# Patient Record
Sex: FEMALE | Race: WHITE | NOT HISPANIC OR LATINO | URBAN - METROPOLITAN AREA
[De-identification: names, ages, dates, MRNs, and addresses within clinical notes are randomized per-mention and may not be internally consistent; named-entity substitution may affect disease eponyms.]

---

## 2017-06-26 ENCOUNTER — INPATIENT (INPATIENT)
Facility: HOSPITAL | Age: 82
LOS: 9 days | Discharge: HOME | DRG: 280 | End: 2017-07-06
Attending: FAMILY MEDICINE | Admitting: HOSPITALIST
Payer: MEDICARE

## 2017-06-26 VITALS
DIASTOLIC BLOOD PRESSURE: 91 MMHG | SYSTOLIC BLOOD PRESSURE: 165 MMHG | RESPIRATION RATE: 20 BRPM | TEMPERATURE: 99 F | HEART RATE: 118 BPM | OXYGEN SATURATION: 98 %

## 2017-06-26 DIAGNOSIS — C18.9 MALIGNANT NEOPLASM OF COLON, UNSPECIFIED: ICD-10-CM

## 2017-06-26 DIAGNOSIS — Z98.89 OTHER SPECIFIED POSTPROCEDURAL STATES: Chronic | ICD-10-CM

## 2017-06-26 DIAGNOSIS — Z96.641 PRESENCE OF RIGHT ARTIFICIAL HIP JOINT: Chronic | ICD-10-CM

## 2017-06-26 DIAGNOSIS — K21.9 GASTRO-ESOPHAGEAL REFLUX DISEASE WITHOUT ESOPHAGITIS: ICD-10-CM

## 2017-06-26 DIAGNOSIS — I48.91 UNSPECIFIED ATRIAL FIBRILLATION: ICD-10-CM

## 2017-06-26 DIAGNOSIS — E13.8 OTHER SPECIFIED DIABETES MELLITUS WITH UNSPECIFIED COMPLICATIONS: ICD-10-CM

## 2017-06-26 DIAGNOSIS — R11.2 NAUSEA WITH VOMITING, UNSPECIFIED: ICD-10-CM

## 2017-06-26 DIAGNOSIS — I15.9 SECONDARY HYPERTENSION, UNSPECIFIED: ICD-10-CM

## 2017-06-26 LAB
ALBUMIN SERPL ELPH-MCNC: 3.3 G/DL — SIGNIFICANT CHANGE UP (ref 3.3–5.2)
ALP SERPL-CCNC: 74 U/L — SIGNIFICANT CHANGE UP (ref 40–120)
ALT FLD-CCNC: 15 U/L — SIGNIFICANT CHANGE UP
ANION GAP SERPL CALC-SCNC: 20 MMOL/L — HIGH (ref 5–17)
APTT BLD: 49.3 SEC — HIGH (ref 27.5–37.4)
AST SERPL-CCNC: 29 U/L — SIGNIFICANT CHANGE UP
BILIRUB SERPL-MCNC: 0.7 MG/DL — SIGNIFICANT CHANGE UP (ref 0.4–2)
BLD GP AB SCN SERPL QL: SIGNIFICANT CHANGE UP
BUN SERPL-MCNC: 31 MG/DL — HIGH (ref 8–20)
CALCIUM SERPL-MCNC: 8.1 MG/DL — LOW (ref 8.6–10.2)
CHLORIDE SERPL-SCNC: 90 MMOL/L — LOW (ref 98–107)
CK SERPL-CCNC: 102 U/L — SIGNIFICANT CHANGE UP (ref 25–170)
CO2 SERPL-SCNC: 23 MMOL/L — SIGNIFICANT CHANGE UP (ref 22–29)
CREAT SERPL-MCNC: 1.64 MG/DL — HIGH (ref 0.5–1.3)
GLUCOSE SERPL-MCNC: 192 MG/DL — HIGH (ref 70–115)
HCT VFR BLD CALC: 33.9 % — LOW (ref 37–47)
HGB BLD-MCNC: 10.4 G/DL — LOW (ref 12–16)
INR BLD: 12.64 RATIO — CRITICAL HIGH (ref 0.88–1.16)
MCHC RBC-ENTMCNC: 23.2 PG — LOW (ref 27–31)
MCHC RBC-ENTMCNC: 30.7 G/DL — LOW (ref 32–36)
MCV RBC AUTO: 75.5 FL — LOW (ref 81–99)
PLATELET # BLD AUTO: 444 K/UL — HIGH (ref 150–400)
POTASSIUM SERPL-MCNC: 4.9 MMOL/L — SIGNIFICANT CHANGE UP (ref 3.5–5.3)
POTASSIUM SERPL-SCNC: 4.9 MMOL/L — SIGNIFICANT CHANGE UP (ref 3.5–5.3)
PROT SERPL-MCNC: 6.8 G/DL — SIGNIFICANT CHANGE UP (ref 6.6–8.7)
PROTHROM AB SERPL-ACNC: 146.4 SEC — HIGH (ref 9.8–12.7)
RBC # BLD: 4.49 M/UL — SIGNIFICANT CHANGE UP (ref 4.4–5.2)
RBC # FLD: 16.7 % — HIGH (ref 11–15.6)
SODIUM SERPL-SCNC: 133 MMOL/L — LOW (ref 135–145)
TROPONIN T SERPL-MCNC: 0.1 NG/ML — HIGH (ref 0–0.06)
TYPE + AB SCN PNL BLD: SIGNIFICANT CHANGE UP
WBC # BLD: 7.5 K/UL — SIGNIFICANT CHANGE UP (ref 4.8–10.8)
WBC # FLD AUTO: 7.5 K/UL — SIGNIFICANT CHANGE UP (ref 4.8–10.8)

## 2017-06-26 PROCEDURE — 70450 CT HEAD/BRAIN W/O DYE: CPT | Mod: 26

## 2017-06-26 PROCEDURE — 74176 CT ABD & PELVIS W/O CONTRAST: CPT | Mod: 26

## 2017-06-26 PROCEDURE — 93458 L HRT ARTERY/VENTRICLE ANGIO: CPT | Mod: 26

## 2017-06-26 PROCEDURE — 99223 1ST HOSP IP/OBS HIGH 75: CPT

## 2017-06-26 PROCEDURE — 99285 EMERGENCY DEPT VISIT HI MDM: CPT

## 2017-06-26 PROCEDURE — 71010: CPT | Mod: 26

## 2017-06-26 RX ORDER — ONDANSETRON 8 MG/1
4 TABLET, FILM COATED ORAL EVERY 6 HOURS
Qty: 0 | Refills: 0 | Status: DISCONTINUED | OUTPATIENT
Start: 2017-06-26 | End: 2017-06-27

## 2017-06-26 RX ORDER — FAMOTIDINE 10 MG/ML
20 INJECTION INTRAVENOUS ONCE
Qty: 0 | Refills: 0 | Status: COMPLETED | OUTPATIENT
Start: 2017-06-26 | End: 2017-06-26

## 2017-06-26 RX ORDER — ONDANSETRON 8 MG/1
8 TABLET, FILM COATED ORAL ONCE
Qty: 0 | Refills: 0 | Status: COMPLETED | OUTPATIENT
Start: 2017-06-26 | End: 2017-06-26

## 2017-06-26 RX ORDER — SODIUM CHLORIDE 9 MG/ML
1000 INJECTION INTRAMUSCULAR; INTRAVENOUS; SUBCUTANEOUS
Qty: 0 | Refills: 0 | Status: DISCONTINUED | OUTPATIENT
Start: 2017-06-26 | End: 2017-06-30

## 2017-06-26 RX ORDER — MORPHINE SULFATE 50 MG/1
2 CAPSULE, EXTENDED RELEASE ORAL ONCE
Qty: 0 | Refills: 0 | Status: DISCONTINUED | OUTPATIENT
Start: 2017-06-26 | End: 2017-06-26

## 2017-06-26 RX ORDER — METRONIDAZOLE 500 MG
500 TABLET ORAL ONCE
Qty: 0 | Refills: 0 | Status: COMPLETED | OUTPATIENT
Start: 2017-06-26 | End: 2017-06-26

## 2017-06-26 RX ORDER — PHYTONADIONE (VIT K1) 5 MG
10 TABLET ORAL ONCE
Qty: 0 | Refills: 0 | Status: COMPLETED | OUTPATIENT
Start: 2017-06-26 | End: 2017-06-26

## 2017-06-26 RX ORDER — MORPHINE SULFATE 50 MG/1
2 CAPSULE, EXTENDED RELEASE ORAL EVERY 4 HOURS
Qty: 0 | Refills: 0 | Status: DISCONTINUED | OUTPATIENT
Start: 2017-06-26 | End: 2017-06-27

## 2017-06-26 RX ORDER — AZTREONAM 2 G
1000 VIAL (EA) INJECTION ONCE
Qty: 0 | Refills: 0 | Status: COMPLETED | OUTPATIENT
Start: 2017-06-26 | End: 2017-06-26

## 2017-06-26 RX ORDER — METOCLOPRAMIDE HCL 10 MG
10 TABLET ORAL ONCE
Qty: 0 | Refills: 0 | Status: COMPLETED | OUTPATIENT
Start: 2017-06-26 | End: 2017-06-26

## 2017-06-26 RX ORDER — VANCOMYCIN HCL 1 G
1000 VIAL (EA) INTRAVENOUS ONCE
Qty: 0 | Refills: 0 | Status: COMPLETED | OUTPATIENT
Start: 2017-06-26 | End: 2017-06-27

## 2017-06-26 RX ORDER — ONDANSETRON 8 MG/1
8 TABLET, FILM COATED ORAL ONCE
Qty: 0 | Refills: 0 | Status: DISCONTINUED | OUTPATIENT
Start: 2017-06-26 | End: 2017-06-26

## 2017-06-26 RX ADMIN — SODIUM CHLORIDE 125 MILLILITER(S): 9 INJECTION INTRAMUSCULAR; INTRAVENOUS; SUBCUTANEOUS at 15:20

## 2017-06-26 RX ADMIN — MORPHINE SULFATE 2 MILLIGRAM(S): 50 CAPSULE, EXTENDED RELEASE ORAL at 18:10

## 2017-06-26 RX ADMIN — Medication 102 MILLIGRAM(S): at 18:45

## 2017-06-26 RX ADMIN — Medication 50 MILLIGRAM(S): at 23:08

## 2017-06-26 RX ADMIN — ONDANSETRON 8 MILLIGRAM(S): 8 TABLET, FILM COATED ORAL at 15:20

## 2017-06-26 RX ADMIN — MORPHINE SULFATE 2 MILLIGRAM(S): 50 CAPSULE, EXTENDED RELEASE ORAL at 19:01

## 2017-06-26 RX ADMIN — Medication 10 MILLIGRAM(S): at 20:18

## 2017-06-26 RX ADMIN — FAMOTIDINE 20 MILLIGRAM(S): 10 INJECTION INTRAVENOUS at 20:39

## 2017-06-26 NOTE — CONSULT NOTE ADULT - ASSESSMENT
85yo F with INR 12.6, ADELITA, abdominal pain and nausea, with noncontrast CT scan, does not have surgical abdomen

## 2017-06-26 NOTE — H&P ADULT - HISTORY OF PRESENT ILLNESS
84 years old female with PMH of A. Fib on Coumadin, CHF, DM, HTN, HLD, Colon Cancer s/p Resection, GERD and Dementia  sent from [facility] for evaluation of elevated Troponin I. As per patient, she is having nausea and vomiting for last 2 weeks and her appetite has decreased. She has had multiple episodes of vomiting associated with abdominal discomfort - mostly in epigastric region. Denies abdominal distention, black stool or blood in vomitus. Denies fever or eating anything unusual. Denies chest pain, palpitations, shortness of breath, cough, headache or dizziness.  She had Troponin I (unsure why it was performed) and it was elevated 0.353, so she was sent to [facility].  In ER, she had CT Abd/Pelvis which showed suspected perforation of stomach or duodenum -- seen by Surgery, no surgical intervention at this time as she does not have surgical abdomen.  Also found to have elevated INR (12.64) - no signs of active bleeding. Got Vit K 10 mg IVPB in ER and now getting FFPs x 2.

## 2017-06-26 NOTE — H&P ADULT - PMH
Atrial fibrillation    Colon cancer    Diabetes    GERD (gastroesophageal reflux disease)    HLD (hyperlipidemia)    Hypertension

## 2017-06-26 NOTE — ED ADULT NURSE REASSESSMENT NOTE - NS ED NURSE REASSESS COMMENT FT1
pt resting comfortably in cart. breathing is even and unlabored. pt awaiting dispo. will continue to monitor and reassess

## 2017-06-26 NOTE — H&P ADULT - NSHPSOCIALHISTORY_GEN_ALL_CORE
Currently in [facility] due to Right Hip Pain  Denies history of smoking of illicit drug use.  Drinks alcohol socially.

## 2017-06-26 NOTE — ED PROVIDER NOTE - CARE PLAN
Principal Discharge DX:	Intra-abdominal free air of unknown etiology  Secondary Diagnosis:	Intractable vomiting with nausea, unspecified vomiting type

## 2017-06-26 NOTE — ED PROVIDER NOTE - PROGRESS NOTE DETAILS
supratherapeutic INR, with n/v; will check head ct and abd ct seen by surgery dr perez; advised medical admission, serial exams, antibiotics, reversal of coumadin, will follow

## 2017-06-26 NOTE — H&P ADULT - NSHPLABSRESULTS_GEN_ALL_CORE
LABS:                        10.4   7.5   )-----------( 444      ( 26 Jun 2017 15:52 )             33.9     06-26    133<L>  |  90<L>  |  31.0<H>  ----------------------------<  192<H>  4.9   |  23.0  |  1.64<H>    Ca    8.1<L>      26 Jun 2017 15:52    TPro  6.8  /  Alb  3.3  /  TBili  0.7  /  DBili  x   /  AST  29  /  ALT  15  /  AlkPhos  74  06-26    PT/INR - ( 26 Jun 2017 15:52 )   PT: 146.4 sec;   INR: 12.64 ratio         PTT - ( 26 Jun 2017 15:52 )  PTT:49.3 sec  CARDIAC MARKERS ( 26 Jun 2017 15:52 )  x     / 0.10 ng/mL / 102 U/L / x     / x              EKG: No acute changes compared to previous EKGs.

## 2017-06-26 NOTE — H&P ADULT - ASSESSMENT
84 years old female with PMH of A. Fib on Coumadin, CHF, DM, HTN, HLD, Colon Cancer s/p Resection, GERD and Dementia  sent from [facility] for evaluation of elevated Troponin I. As per patient, she is having nausea and vomiting for last 2 weeks and her appetite has decreased. She has had multiple episodes of vomiting associated with abdominal discomfort - mostly in epigastric region. Denies abdominal distention, black stool or blood in vomitus. Denies fever or eating anything unusual. Denies chest pain, palpitations, shortness of breath, cough, headache or dizziness.  She had Troponin I (unsure why it was performed) and it was elevated 0.353, so she was sent to [facility].  In ER, she had CT Abd/Pelvis which showed suspected perforation of stomach or duodenum -- seen by Surgery, no surgical intervention at this time as she does not have surgical abdomen.  Also found to have elevated INR (12.64) - no signs of active bleeding. Got Vit K 10 mg IVPB in ER and now getting FFPs x 2.    1) Intractable Nausea and Vomiting  - Zofran 4 mg PRN  - Will add Metoclopramide if patient has persistent vomiting  - ? Gastropaeresis. Patient had similar episodes in past.  2) Suspected Stomach/Duodenum perforation  - Surgery Consult appreciated  - Serial Abdominal Exams  - Will keep patient NPO and will continue antibiotics for prophylaxis  3) GERD  - Protonix 40 mg  4) Supra-therapeutic INR  - No signs of bleeding  - Got Vit K 10 mg IVPB and now getting FFPs x 2  - Repeat INR  5) ADELITA  - Gentle hydration  - Hold Lasix and Lisinopril. Avoid nephrotoxic medications.  6) A. Fib  - Rate controlled  - Metoprolol 5 mg IVP q 6 hours  - INR is supra-therapeutic  7) CHF  - Hold Lasix as patient clinically is dehydrated.  - Gentle hydration  - Metoprolol 5 mg IVP q 6 hours  - Hold Lisinopril due to ADELITA  8) DM  - Accu checks and RISS  DVT Prophylaxis -- INR is supra-therapeutic 84 years old female with PMH of A. Fib on Coumadin, CHF, DM, HTN, HLD, Colon Cancer s/p Resection, GERD and Dementia  sent from [facility] for evaluation of elevated Troponin I. As per patient, she is having nausea and vomiting for last 2 weeks and her appetite has decreased. She has had multiple episodes of vomiting associated with abdominal discomfort - mostly in epigastric region. Denies abdominal distention, black stool or blood in vomitus. Denies fever or eating anything unusual. Denies chest pain, palpitations, shortness of breath, cough, headache or dizziness.  She had Troponin I (unsure why it was performed) and it was elevated 0.353, so she was sent to [facility].  In ER, she had CT Abd/Pelvis which showed suspected perforation of stomach or duodenum -- seen by Surgery, no surgical intervention at this time as she does not have surgical abdomen.  Also found to have elevated INR (12.64) - no signs of active bleeding. Got Vit K 10 mg IVPB in ER and now getting FFPs x 2.    1) Intractable Nausea and Vomiting  - Zofran 4 mg PRN  - Will add Metoclopramide if patient has persistent vomiting  - ? Gastropaeresis. Patient had similar episodes in past.  2) Suspected Stomach/Duodenum perforation  - Surgery Consult appreciated  - Serial Abdominal Exams  - Will keep patient NPO and will continue antibiotics for prophylaxis  3) GERD  - Protonix 40 mg  4) Supra-therapeutic INR  - No signs of bleeding  - Got Vit K 10 mg IVPB and now getting FFPs x 2  - Repeat INR  5) ADELITA  - Gentle hydration  - Hold Lasix and Lisinopril. Avoid nephrotoxic medications.  6) A. Fib  - Rate controlled  - Metoprolol 5 mg IVP q 6 hours  - INR is supra-therapeutic  7) CHF  - Hold Lasix as patient clinically is dehydrated.  - Gentle hydration  - Metoprolol 5 mg IVP q 6 hours  - Hold Lisinopril due to ADELITA  8) Elevated Troponin  - Likely secondary to ADELITA  - No chest pain and no acute EKG changes  9) DM  - Accu checks and RISS  DVT Prophylaxis -- INR is supra-therapeutic

## 2017-06-26 NOTE — ED ADULT NURSE NOTE - OBJECTIVE STATEMENT
pt presents to ED with nausea and spitting up mucus x two weeks. pt sent to ED by [facility]. pt denies abd pain. dneies diarrhea. afebrile. breathing si even and unlabored. a&ox3 PERRL. maex4 lungs cta. denies chest pain/sob. abd soft nt nd +bs. skin w/d/i. pt placed on cardiac monitor a-fib noted with rate of 111. will continue to monitor and reassess

## 2017-06-26 NOTE — H&P ADULT - NSHPPHYSICALEXAM_GEN_ALL_CORE
Vital Signs   T(C): 36.6, Max: 37.1 (06-26 @ 12:45)  T(F): 97.8, Max: 98.7 (06-26 @ 12:45)  HR: 86 (69 - 118)  BP: 135/63 (135/63 - 168/86)  RR: 18 (16 - 20)  SpO2: 100% (98% - 100%)  General: Well developed. Well nourished. No acute distress  HEENT: PERRLA. EOMI. Clear conjunctivae. Dry mucus membrane  Neck: Supple. No JVD. No Thyromegaly   Chest: CTA bilaterally - no wheezing, rales or rhonchi.   Heart: S1 & S2 with regularly irregular rhythm.   Abdomen: Soft. Non-tender. Non-distended. + BS  Ext: 2+ pedal edema. No calf tenderness   Neuro: AAO x 3, No focal deficit, CN II-XII grossly WNL and no speech disorder  Skin: Warm and Dry  Psychiatry: Normal mood and affect

## 2017-06-26 NOTE — ED PROVIDER NOTE - OBJECTIVE STATEMENT
85 yo female Afib on coumadin from chronic care facility; sent in for persistent nausea and vomiting for 2 weeks; patient lives in chronic care facility due to chronic hip pain, difficulty walking; states she has been nauseous and vomiting multiple times daily for 2 weeks, and has had labs and ecg's but no conculsion, has not had any imaging; symptoms assoc with difuse abd pain, moderate, aching, non radiating; minimal relief at facility with antiemetics.Recorcds also report elevated troponin, unclear from history how and why this was done

## 2017-06-26 NOTE — ED PROVIDER NOTE - PMH
Atrial fibrillation    Colon cancer    Diabetes    GERD (gastroesophageal reflux disease)    Hypertension

## 2017-06-26 NOTE — CONSULT NOTE ADULT - SUBJECTIVE AND OBJECTIVE BOX
85yo F resident of [facility] presents with 2 week history of nausea, vomiting, anorexia and dehydration.  Pt was sent to ED and found to be supratherapeutic on coumadin for afib (INR 12.6), with an acute kidney injury and could not tolerate PO.  Pt doesn't remember last BM or if passed flatus, but now just feels uncomfortable.  Pt accompanied by  who states she hasn't been eating very much lately.  Pt has history of colon cancer, s/p resection, however pt doesn't remember what type of surgery she had.    Allergy: PCN    Past Medical History:  Atrial fibrillation    Colon cancer    Diabetes    GERD (gastroesophageal reflux disease)    Hypertension.    Past Surgical History:  H/O exploratory laparotomy  for colon cancer  History of appendectomy    S/P hip replacement, right.      MEDICATIONS  (STANDING):  sodium chloride 0.9%. 1000milliLiter(s) IV Continuous <Continuous>  metroNIDAZOLE  IVPB 500milliGRAM(s) IV Intermittent Once  vancomycin  IVPB 1000milliGRAM(s) IV Intermittent once    MEDICATIONS  (PRN):      Vital Signs Last 24 Hrs  T(C): 36.8, Max: 37.1 (06-26 @ 12:45)  T(F): 98.2, Max: 98.7 (06-26 @ 12:45)  HR: 97 (69 - 118)  BP: 168/86 (165/91 - 168/86)  BP(mean): --  RR: 18 (16 - 20)  SpO2: 99% (98% - 99%)    PE  Gen:  A+O, Uncomfortable appearing  Pulm: CTABL  CV: sinus tach 104bpm  Abd: soft, nontender, nondistended, well healed lower midline abdominal scar, RLQ scar  Ext: moves all extremities, RLE + homans sign    I&O's Detail      LABS:                        10.4   7.5   )-----------( 444      ( 26 Jun 2017 15:52 )             33.9     06-26    133<L>  |  90<L>  |  31.0<H>  ----------------------------<  192<H>  4.9   |  23.0  |  1.64<H>    Ca    8.1<L>      26 Jun 2017 15:52    TPro  6.8  /  Alb  3.3  /  TBili  0.7  /  DBili  x   /  AST  29  /  ALT  15  /  AlkPhos  74  06-26    PT/INR - ( 26 Jun 2017 15:52 )   PT: 146.4 sec;   INR: 12.64 ratio         PTT - ( 26 Jun 2017 15:52 )  PTT:49.3 sec      RADIOLOGY & ADDITIONAL STUDIES:

## 2017-06-27 DIAGNOSIS — R74.8 ABNORMAL LEVELS OF OTHER SERUM ENZYMES: ICD-10-CM

## 2017-06-27 DIAGNOSIS — K66.8 OTHER SPECIFIED DISORDERS OF PERITONEUM: ICD-10-CM

## 2017-06-27 LAB
ABO RH CONFIRMATION: SIGNIFICANT CHANGE UP
ANION GAP SERPL CALC-SCNC: 21 MMOL/L — HIGH (ref 5–17)
BASOPHILS # BLD AUTO: 0 K/UL — SIGNIFICANT CHANGE UP (ref 0–0.2)
BASOPHILS NFR BLD AUTO: 0.1 % — SIGNIFICANT CHANGE UP (ref 0–2)
BUN SERPL-MCNC: 32 MG/DL — HIGH (ref 8–20)
CALCIUM SERPL-MCNC: 8 MG/DL — LOW (ref 8.6–10.2)
CHLORIDE SERPL-SCNC: 90 MMOL/L — LOW (ref 98–107)
CK SERPL-CCNC: 139 U/L — SIGNIFICANT CHANGE UP (ref 25–170)
CO2 SERPL-SCNC: 20 MMOL/L — LOW (ref 22–29)
CREAT SERPL-MCNC: 1.65 MG/DL — HIGH (ref 0.5–1.3)
CRP SERPL-MCNC: 0.7 MG/DL — HIGH (ref 0–0.4)
EOSINOPHIL # BLD AUTO: 0 K/UL — SIGNIFICANT CHANGE UP (ref 0–0.5)
EOSINOPHIL NFR BLD AUTO: 0.3 % — SIGNIFICANT CHANGE UP (ref 0–6)
ERYTHROCYTE [SEDIMENTATION RATE] IN BLOOD: 18 MM/HR — SIGNIFICANT CHANGE UP (ref 0–20)
GLUCOSE SERPL-MCNC: 234 MG/DL — HIGH (ref 70–115)
HCT VFR BLD CALC: 30 % — LOW (ref 37–47)
HGB BLD-MCNC: 9.3 G/DL — LOW (ref 12–16)
INR BLD: 2.85 RATIO — HIGH (ref 0.88–1.16)
LACTATE BLDV-MCNC: 5.2 MMOL/L — CRITICAL HIGH (ref 0.5–2)
LYMPHOCYTES # BLD AUTO: 0.9 K/UL — LOW (ref 1–4.8)
LYMPHOCYTES # BLD AUTO: 11.2 % — LOW (ref 20–55)
MAGNESIUM SERPL-MCNC: 1.4 MG/DL — LOW (ref 1.6–2.6)
MCHC RBC-ENTMCNC: 23.3 PG — LOW (ref 27–31)
MCHC RBC-ENTMCNC: 31 G/DL — LOW (ref 32–36)
MCV RBC AUTO: 75.2 FL — LOW (ref 81–99)
MONOCYTES # BLD AUTO: 0.8 K/UL — SIGNIFICANT CHANGE UP (ref 0–0.8)
MONOCYTES NFR BLD AUTO: 10.4 % — HIGH (ref 3–10)
NEUTROPHILS # BLD AUTO: 6.2 K/UL — SIGNIFICANT CHANGE UP (ref 1.8–8)
NEUTROPHILS NFR BLD AUTO: 77.7 % — HIGH (ref 37–73)
PHOSPHATE SERPL-MCNC: 4 MG/DL — SIGNIFICANT CHANGE UP (ref 2.4–4.7)
PLATELET # BLD AUTO: 422 K/UL — HIGH (ref 150–400)
POTASSIUM SERPL-MCNC: 4.7 MMOL/L — SIGNIFICANT CHANGE UP (ref 3.5–5.3)
POTASSIUM SERPL-SCNC: 4.7 MMOL/L — SIGNIFICANT CHANGE UP (ref 3.5–5.3)
PROTHROM AB SERPL-ACNC: 32 SEC — HIGH (ref 9.8–12.7)
RBC # BLD: 3.99 M/UL — LOW (ref 4.4–5.2)
RBC # FLD: 16.8 % — HIGH (ref 11–15.6)
SODIUM SERPL-SCNC: 131 MMOL/L — LOW (ref 135–145)
TROPONIN T SERPL-MCNC: 0.08 NG/ML — HIGH (ref 0–0.06)
WBC # BLD: 8 K/UL — SIGNIFICANT CHANGE UP (ref 4.8–10.8)
WBC # FLD AUTO: 8 K/UL — SIGNIFICANT CHANGE UP (ref 4.8–10.8)

## 2017-06-27 RX ORDER — DEXTROSE 50 % IN WATER 50 %
12.5 SYRINGE (ML) INTRAVENOUS ONCE
Qty: 0 | Refills: 0 | Status: DISCONTINUED | OUTPATIENT
Start: 2017-06-27 | End: 2017-07-02

## 2017-06-27 RX ORDER — MORPHINE SULFATE 50 MG/1
2 CAPSULE, EXTENDED RELEASE ORAL EVERY 4 HOURS
Qty: 0 | Refills: 0 | Status: DISCONTINUED | OUTPATIENT
Start: 2017-06-27 | End: 2017-07-03

## 2017-06-27 RX ORDER — DEXTROSE 50 % IN WATER 50 %
1 SYRINGE (ML) INTRAVENOUS ONCE
Qty: 0 | Refills: 0 | Status: DISCONTINUED | OUTPATIENT
Start: 2017-06-27 | End: 2017-07-02

## 2017-06-27 RX ORDER — GLUCAGON INJECTION, SOLUTION 0.5 MG/.1ML
1 INJECTION, SOLUTION SUBCUTANEOUS ONCE
Qty: 0 | Refills: 0 | Status: DISCONTINUED | OUTPATIENT
Start: 2017-06-27 | End: 2017-07-02

## 2017-06-27 RX ORDER — ONDANSETRON 8 MG/1
4 TABLET, FILM COATED ORAL EVERY 4 HOURS
Qty: 0 | Refills: 0 | Status: DISCONTINUED | OUTPATIENT
Start: 2017-06-27 | End: 2017-07-06

## 2017-06-27 RX ORDER — INSULIN LISPRO 100/ML
VIAL (ML) SUBCUTANEOUS EVERY 6 HOURS
Qty: 0 | Refills: 0 | Status: DISCONTINUED | OUTPATIENT
Start: 2017-06-27 | End: 2017-07-02

## 2017-06-27 RX ORDER — ERTAPENEM SODIUM 1 G/1
500 INJECTION, POWDER, LYOPHILIZED, FOR SOLUTION INTRAMUSCULAR; INTRAVENOUS EVERY 24 HOURS
Qty: 0 | Refills: 0 | Status: DISCONTINUED | OUTPATIENT
Start: 2017-06-27 | End: 2017-06-30

## 2017-06-27 RX ORDER — MORPHINE SULFATE 50 MG/1
4 CAPSULE, EXTENDED RELEASE ORAL EVERY 4 HOURS
Qty: 0 | Refills: 0 | Status: DISCONTINUED | OUTPATIENT
Start: 2017-06-27 | End: 2017-07-03

## 2017-06-27 RX ORDER — PANTOPRAZOLE SODIUM 20 MG/1
40 TABLET, DELAYED RELEASE ORAL
Qty: 0 | Refills: 0 | Status: DISCONTINUED | OUTPATIENT
Start: 2017-06-27 | End: 2017-07-01

## 2017-06-27 RX ORDER — SODIUM CHLORIDE 9 MG/ML
1000 INJECTION, SOLUTION INTRAVENOUS
Qty: 0 | Refills: 0 | Status: DISCONTINUED | OUTPATIENT
Start: 2017-06-27 | End: 2017-07-02

## 2017-06-27 RX ORDER — DEXTROSE 50 % IN WATER 50 %
25 SYRINGE (ML) INTRAVENOUS ONCE
Qty: 0 | Refills: 0 | Status: DISCONTINUED | OUTPATIENT
Start: 2017-06-27 | End: 2017-07-02

## 2017-06-27 RX ORDER — METOPROLOL TARTRATE 50 MG
5 TABLET ORAL EVERY 6 HOURS
Qty: 0 | Refills: 0 | Status: DISCONTINUED | OUTPATIENT
Start: 2017-06-27 | End: 2017-06-30

## 2017-06-27 RX ADMIN — Medication 5 MILLIGRAM(S): at 02:56

## 2017-06-27 RX ADMIN — PANTOPRAZOLE SODIUM 40 MILLIGRAM(S): 20 TABLET, DELAYED RELEASE ORAL at 22:08

## 2017-06-27 RX ADMIN — ERTAPENEM SODIUM 100 MILLIGRAM(S): 1 INJECTION, POWDER, LYOPHILIZED, FOR SOLUTION INTRAMUSCULAR; INTRAVENOUS at 11:46

## 2017-06-27 RX ADMIN — Medication 5 MILLIGRAM(S): at 17:26

## 2017-06-27 RX ADMIN — MORPHINE SULFATE 4 MILLIGRAM(S): 50 CAPSULE, EXTENDED RELEASE ORAL at 23:56

## 2017-06-27 RX ADMIN — Medication 250 MILLIGRAM(S): at 05:56

## 2017-06-27 RX ADMIN — MORPHINE SULFATE 2 MILLIGRAM(S): 50 CAPSULE, EXTENDED RELEASE ORAL at 07:45

## 2017-06-27 RX ADMIN — MORPHINE SULFATE 2 MILLIGRAM(S): 50 CAPSULE, EXTENDED RELEASE ORAL at 12:15

## 2017-06-27 RX ADMIN — Medication 2: at 23:29

## 2017-06-27 RX ADMIN — ONDANSETRON 4 MILLIGRAM(S): 8 TABLET, FILM COATED ORAL at 18:44

## 2017-06-27 RX ADMIN — MORPHINE SULFATE 2 MILLIGRAM(S): 50 CAPSULE, EXTENDED RELEASE ORAL at 06:09

## 2017-06-27 RX ADMIN — Medication 5 MILLIGRAM(S): at 11:46

## 2017-06-27 RX ADMIN — Medication 5 MILLIGRAM(S): at 06:09

## 2017-06-27 RX ADMIN — Medication 3: at 18:49

## 2017-06-27 RX ADMIN — SODIUM CHLORIDE 80 MILLILITER(S): 9 INJECTION INTRAMUSCULAR; INTRAVENOUS; SUBCUTANEOUS at 18:51

## 2017-06-27 RX ADMIN — MORPHINE SULFATE 2 MILLIGRAM(S): 50 CAPSULE, EXTENDED RELEASE ORAL at 11:46

## 2017-06-27 RX ADMIN — MORPHINE SULFATE 4 MILLIGRAM(S): 50 CAPSULE, EXTENDED RELEASE ORAL at 23:26

## 2017-06-27 RX ADMIN — PANTOPRAZOLE SODIUM 40 MILLIGRAM(S): 20 TABLET, DELAYED RELEASE ORAL at 02:54

## 2017-06-27 RX ADMIN — ONDANSETRON 4 MILLIGRAM(S): 8 TABLET, FILM COATED ORAL at 09:56

## 2017-06-27 RX ADMIN — Medication 5 MILLIGRAM(S): at 23:26

## 2017-06-27 RX ADMIN — ONDANSETRON 4 MILLIGRAM(S): 8 TABLET, FILM COATED ORAL at 03:19

## 2017-06-27 RX ADMIN — Medication 200 MILLIGRAM(S): at 00:00

## 2017-06-27 RX ADMIN — PANTOPRAZOLE SODIUM 40 MILLIGRAM(S): 20 TABLET, DELAYED RELEASE ORAL at 06:10

## 2017-06-27 NOTE — PROGRESS NOTE ADULT - ASSESSMENT
83yo F resident of [facility] presents with 2 week history of nausea, vomiting, anorexia and dehydration.  -serial abdominal exams  -monitor nausea/emesis, consider NGT  -pain management  -dvt ppx  -continue management as per primary team  -reverse supratherapeutic INR

## 2017-06-27 NOTE — CONSULT NOTE ADULT - SUBJECTIVE AND OBJECTIVE BOX
HPI:  84 years old female with PMH of LUIS ANTONIO Carrion on Coumadin, CHF, DM, HTN, HLD, Colon Cancer s/p Resection, GERD and Dementia  sent from [facility] for evaluation of elevated Troponin I. As per patient, she is having nausea and vomiting for last 2 weeks and her appetite has decreased. She has had multiple episodes of vomiting associated with abdominal discomfort - mostly in epigastric region. Denies abdominal distention, black stool or blood in vomitus. Denies fever or eating anything unusual. Denies chest pain, palpitations, shortness of breath, cough, headache or dizziness.  She had Troponin I (unsure why it was performed) and it was elevated 0.353, so she was sent to [facility].  In ER, she had CT Abd/Pelvis which showed suspected perforation of stomach or duodenum -- seen by Surgery, no surgical intervention at this time as she does not have surgical abdomen.  Also found to have elevated INR (12.64) - no signs of active bleeding. Got Vit K 10 mg IVPB in ER and now getting FFPs x 2. (26 Jun 2017 23:46)    she f/u with Dr. Benoit (GI at [facility]). no h/o PUD in the past (as per patient). hemodynamically stable. no abd distension. no fever      PAST MEDICAL & SURGICAL HISTORY:  HLD (hyperlipidemia)  Colon cancer  GERD (gastroesophageal reflux disease)  Atrial fibrillation  Diabetes  Hypertension  S/P hip replacement, right  H/O exploratory laparotomy: for colon cancer  History of appendectomy      REVIEW OF SYSTEMS    General: unremarkable, no fever    Skin/Breast: unremarkable  	  Ophthalmologic: unremarkable  	  ENMT:	unremarkable    Respiratory and Thorax: unremarkable  	  Cardiovascular:	unremarkable    Gastrointestinal:	 as above    Genitourinary:	unremarkable    Musculoskeletal:	unremarkable    Neurological:	unremarkable    Psychiatric:	unremarkable    Hematology/Lymphatics:	unremarkable    Endocrine:	unremarkable    Allergic/Immunologic:	unremarkable      MEDICATIONS  (STANDING):  sodium chloride 0.9%. 1000 milliLiter(s) (80 mL/Hr) IV Continuous <Continuous>  insulin lispro (HumaLOG) corrective regimen sliding scale   SubCutaneous every 6 hours  dextrose 5%. 1000 milliLiter(s) (50 mL/Hr) IV Continuous <Continuous>  dextrose 50% Injectable 12.5 Gram(s) IV Push once  dextrose 50% Injectable 25 Gram(s) IV Push once  dextrose 50% Injectable 25 Gram(s) IV Push once  ertapenem  IVPB 500 milliGRAM(s) IV Intermittent every 24 hours  pantoprazole  Injectable 40 milliGRAM(s) IV Push two times a day  metoprolol Injectable 5 milliGRAM(s) IV Push every 6 hours    MEDICATIONS  (PRN):  morphine  - Injectable 2 milliGRAM(s) IV Push every 4 hours PRN Moderate Pain (4 - 6)  morphine  - Injectable 4 milliGRAM(s) IV Push every 4 hours PRN Severe Pain (7 - 10)  dextrose Gel 1 Dose(s) Oral once PRN Blood Glucose LESS THAN 70 milliGRAM(s)/deciLiter  glucagon  Injectable 1 milliGRAM(s) IntraMuscular once PRN Glucose <70 milliGRAM(s)/deciLiter  ondansetron Injectable 4 milliGRAM(s) IV Push every 4 hours PRN Nausea and/or Vomiting      Allergies    penicillin (Hives)    Intolerances        SOCIAL HISTORY:    FAMILY HISTORY:  Family history of diabetes mellitus (Father)      Vital Signs Last 24 Hrs  T(C): 36.5 (27 Jun 2017 11:40), Max: 36.8 (26 Jun 2017 17:30)  T(F): 97.7 (27 Jun 2017 11:40), Max: 98.3 (27 Jun 2017 02:57)  HR: 82 (27 Jun 2017 11:40) (69 - 114)  BP: 180/94 (27 Jun 2017 11:40) (135/63 - 180/94)  BP(mean): --  RR: 18 (27 Jun 2017 11:40) (16 - 19)  SpO2: 96% (27 Jun 2017 11:40) (96% - 100%)      PHYSICAL EXAM:    Constitutional: no fever, hemodynamically stable    Eyes: unremarkable    ENMT: unremarkable    Neck: unremarkable    Breasts: deferred    Back: unremarkable    Respiratory: unremarkable    Cardiovascular: unremarkable    Gastrointestinal: bowel sounds present, soft, non-tender, no organomegaly    Genitourinary: deferred    Rectal: deferred    Extremities: unremarkable    Vascular: unremarkable    Neurological: Awake, alert, oriented x3, no focal neurological deficit    Skin: unremarkable    Lymph Nodes: deferred    Musculoskeletal: unremarkable    Psychiatric: unremarkable    LABS:                        9.3    8.0   )-----------( 422      ( 27 Jun 2017 08:22 )             30.0     06-27    131<L>  |  90<L>  |  32.0<H>  ----------------------------<  234<H>  4.7   |  20.0<L>  |  1.65<H>    Ca    8.0<L>      27 Jun 2017 08:22  Phos  4.0     06-27  Mg     1.4     06-27    TPro  6.8  /  Alb  3.3  /  TBili  0.7  /  DBili  x   /  AST  29  /  ALT  15  /  AlkPhos  74  06-26    PT/INR - ( 27 Jun 2017 08:22 )   PT: 32.0 sec;   INR: 2.85 ratio         PTT - ( 26 Jun 2017 15:52 )  PTT:49.3 sec      RADIOLOGY & ADDITIONAL STUDIES:        IMPRESSION:  84 years old female with PMH of A. Fib on Coumadin, CHF, DM, HTN, HLD, Colon Cancer s/p Resection, GERD and Dementia  sent from [facility] for evaluation of elevated Troponin I. As per patient, she is having nausea and vomiting for last 2 weeks and her appetite has decreased. She has had multiple episodes of vomiting associated with abdominal discomfort - mostly in epigastric region. Denies abdominal distention, black stool or blood in vomitus. In ER, she had CT Abd/Pelvis which showed suspected perforation of stomach or duodenum. No evidence of acute abdomen.    PLAN:  NPO  IV PPI bid  IV zofran PRN  f/u surgery  no EGD for now (risk of mina perforation)    thanks for the consult

## 2017-06-27 NOTE — PROGRESS NOTE ADULT - SUBJECTIVE AND OBJECTIVE BOX
SUBJECTIVE: Patient seen and examined. No acute events overnight. Pt continues to report nausea and abdominal pain. Pt states her pain has not improved since admission, remains NPO, positive flatus, negative BM. Surgery will continue to follow and monitor abd exam.      MEDICATIONS  (STANDING):  sodium chloride 0.9%. 1000 milliLiter(s) (80 mL/Hr) IV Continuous <Continuous>  insulin lispro (HumaLOG) corrective regimen sliding scale   SubCutaneous every 6 hours  dextrose 5%. 1000 milliLiter(s) (50 mL/Hr) IV Continuous <Continuous>  dextrose 50% Injectable 12.5 Gram(s) IV Push once  dextrose 50% Injectable 25 Gram(s) IV Push once  dextrose 50% Injectable 25 Gram(s) IV Push once  ertapenem  IVPB 500 milliGRAM(s) IV Intermittent every 24 hours  pantoprazole  Injectable 40 milliGRAM(s) IV Push two times a day  metoprolol Injectable 5 milliGRAM(s) IV Push every 6 hours    MEDICATIONS  (PRN):  ondansetron Injectable 4 milliGRAM(s) IV Push every 6 hours PRN Nausea and/or Vomiting  morphine  - Injectable 2 milliGRAM(s) IV Push every 4 hours PRN Moderate Pain (4 - 6)  morphine  - Injectable 4 milliGRAM(s) IV Push every 4 hours PRN Severe Pain (7 - 10)  dextrose Gel 1 Dose(s) Oral once PRN Blood Glucose LESS THAN 70 milliGRAM(s)/deciLiter  glucagon  Injectable 1 milliGRAM(s) IntraMuscular once PRN Glucose <70 milliGRAM(s)/deciLiter      Vital Signs Last 24 Hrs  T(C): 36.1 (27 Jun 2017 08:41), Max: 37.1 (26 Jun 2017 12:45)  T(F): 97 (27 Jun 2017 08:41), Max: 98.7 (26 Jun 2017 12:45)  HR: 88 (27 Jun 2017 08:41) (69 - 118)  BP: 161/84 (27 Jun 2017 08:41) (135/63 - 168/86)  BP(mean): --  RR: 19 (27 Jun 2017 08:41) (16 - 20)  SpO2: 98% (27 Jun 2017 08:41) (98% - 100%)    PE  Gen: NAD, AAOx3  Pulm: CTAB  CV: RRR  Abd: soft, obese, distended, nontender  Ext: moving all extremities  Vasc: 2+ peripheral pulses  Neuro: GCS 15, nonfocal      I&O's Detail      LABS:                        10.4   7.5   )-----------( 444      ( 26 Jun 2017 15:52 )             33.9     06-26    133<L>  |  90<L>  |  31.0<H>  ----------------------------<  192<H>  4.9   |  23.0  |  1.64<H>    Ca    8.1<L>      26 Jun 2017 15:52    TPro  6.8  /  Alb  3.3  /  TBili  0.7  /  DBili  x   /  AST  29  /  ALT  15  /  AlkPhos  74  06-26    PT/INR - ( 27 Jun 2017 08:22 )   PT: 32.0 sec;   INR: 2.85 ratio         PTT - ( 26 Jun 2017 15:52 )  PTT:49.3 sec

## 2017-06-27 NOTE — PROGRESS NOTE ADULT - SUBJECTIVE AND OBJECTIVE BOX
SUBJECTIVE    REVIEW OF SYSTEMS    General: Not in any pain	    Skin/Breast: No rash  	  ENMT: No visual problems, no sore throat	    Respiratory and Thorax: No cough, No CP, No SOB  	  Cardiovascular: No CP, No palpitations    Gastrointestinal: No Abd pain, No N/V/D    Musculoskeletal: No Joint pain, No back pain	    Neurological: No headache    Psychiatric: No anxiety      OBJECTIVE    Vital Signs Last 24 Hrs  T(C): 36.6 (06-27-17 @ 17:20), Max: 36.8 (06-27-17 @ 02:57)  T(F): 97.8 (06-27-17 @ 17:20), Max: 98.3 (06-27-17 @ 02:57)  HR: 81 (06-27-17 @ 17:20) (81 - 114)  BP: 175/80 (06-27-17 @ 17:20) (135/63 - 180/94)  BP(mean): --  RR: 18 (06-27-17 @ 17:20) (18 - 19)  SpO2: 98% (06-27-17 @ 17:20) (96% - 100%)    PHYSICAL EXAM:    Constitutional: Not in any distress    Eyes: No conjunctival injection    ENMT: No oral lesions    Neck: No nodes, no adenopathy    Back: Straight, no defects    Respiratory: clear b/l    Cardiovascular: RRR, no murmur    Gastrointestinal: soft, NT, ND    Extremities: No edema, no erythema    Neurological: no focal deficit    Skin: No rash      MEDICATIONS  (STANDING):  sodium chloride 0.9%. 1000 milliLiter(s) (80 mL/Hr) IV Continuous <Continuous>  insulin lispro (HumaLOG) corrective regimen sliding scale   SubCutaneous every 6 hours  dextrose 5%. 1000 milliLiter(s) (50 mL/Hr) IV Continuous <Continuous>  dextrose 50% Injectable 12.5 Gram(s) IV Push once  dextrose 50% Injectable 25 Gram(s) IV Push once  dextrose 50% Injectable 25 Gram(s) IV Push once  ertapenem  IVPB 500 milliGRAM(s) IV Intermittent every 24 hours  pantoprazole  Injectable 40 milliGRAM(s) IV Push two times a day  metoprolol Injectable 5 milliGRAM(s) IV Push every 6 hours    MEDICATIONS  (PRN):  morphine  - Injectable 2 milliGRAM(s) IV Push every 4 hours PRN Moderate Pain (4 - 6)  morphine  - Injectable 4 milliGRAM(s) IV Push every 4 hours PRN Severe Pain (7 - 10)  dextrose Gel 1 Dose(s) Oral once PRN Blood Glucose LESS THAN 70 milliGRAM(s)/deciLiter  glucagon  Injectable 1 milliGRAM(s) IntraMuscular once PRN Glucose <70 milliGRAM(s)/deciLiter  ondansetron Injectable 4 milliGRAM(s) IV Push every 4 hours PRN Nausea and/or Vomiting    CARDIAC MARKERS ( 27 Jun 2017 08:22 )  x     / 0.08 ng/mL / 139 U/L / x     / x      CARDIAC MARKERS ( 26 Jun 2017 15:52 )  x     / 0.10 ng/mL / 102 U/L / x     / x                                9.3    8.0   )-----------( 422      ( 27 Jun 2017 08:22 )             30.0     27 Jun 2017 08:22    131    |  90     |  32.0   ----------------------------<  234    4.7     |  20.0   |  1.65     Ca    8.0        27 Jun 2017 08:22  Phos  4.0       27 Jun 2017 08:22  Mg     1.4       27 Jun 2017 08:22    TPro  6.8    /  Alb  3.3    /  TBili  0.7    /  DBili  x      /  AST  29     /  ALT  15     /  AlkPhos  74     26 Jun 2017 15:52    Allergies    penicillin (Hives)    Intolerances

## 2017-06-28 LAB
ANION GAP SERPL CALC-SCNC: 17 MMOL/L — SIGNIFICANT CHANGE UP (ref 5–17)
BUN SERPL-MCNC: 34 MG/DL — HIGH (ref 8–20)
CALCIUM SERPL-MCNC: 7.6 MG/DL — LOW (ref 8.6–10.2)
CHLORIDE SERPL-SCNC: 96 MMOL/L — LOW (ref 98–107)
CO2 SERPL-SCNC: 22 MMOL/L — SIGNIFICANT CHANGE UP (ref 22–29)
CREAT SERPL-MCNC: 1.59 MG/DL — HIGH (ref 0.5–1.3)
GLUCOSE SERPL-MCNC: 194 MG/DL — HIGH (ref 70–115)
HCT VFR BLD CALC: 30.8 % — LOW (ref 37–47)
HGB BLD-MCNC: 9.5 G/DL — LOW (ref 12–16)
INR BLD: 1.69 RATIO — HIGH (ref 0.88–1.16)
MCHC RBC-ENTMCNC: 22.9 PG — LOW (ref 27–31)
MCHC RBC-ENTMCNC: 30.8 G/DL — LOW (ref 32–36)
MCV RBC AUTO: 74.4 FL — LOW (ref 81–99)
PLATELET # BLD AUTO: 412 K/UL — HIGH (ref 150–400)
POTASSIUM SERPL-MCNC: 4.5 MMOL/L — SIGNIFICANT CHANGE UP (ref 3.5–5.3)
POTASSIUM SERPL-SCNC: 4.5 MMOL/L — SIGNIFICANT CHANGE UP (ref 3.5–5.3)
PROTHROM AB SERPL-ACNC: 18.8 SEC — HIGH (ref 9.8–12.7)
RBC # BLD: 4.14 M/UL — LOW (ref 4.4–5.2)
RBC # FLD: 17.2 % — HIGH (ref 11–15.6)
SODIUM SERPL-SCNC: 135 MMOL/L — SIGNIFICANT CHANGE UP (ref 135–145)
WBC # BLD: 7.8 K/UL — SIGNIFICANT CHANGE UP (ref 4.8–10.8)
WBC # FLD AUTO: 7.8 K/UL — SIGNIFICANT CHANGE UP (ref 4.8–10.8)

## 2017-06-28 PROCEDURE — 93306 TTE W/DOPPLER COMPLETE: CPT | Mod: 26

## 2017-06-28 RX ORDER — MAGNESIUM SULFATE 500 MG/ML
2 VIAL (ML) INJECTION ONCE
Qty: 0 | Refills: 0 | Status: COMPLETED | OUTPATIENT
Start: 2017-06-28 | End: 2017-06-28

## 2017-06-28 RX ADMIN — MORPHINE SULFATE 2 MILLIGRAM(S): 50 CAPSULE, EXTENDED RELEASE ORAL at 10:34

## 2017-06-28 RX ADMIN — ONDANSETRON 4 MILLIGRAM(S): 8 TABLET, FILM COATED ORAL at 23:56

## 2017-06-28 RX ADMIN — Medication: at 06:29

## 2017-06-28 RX ADMIN — Medication 2: at 13:16

## 2017-06-28 RX ADMIN — Medication 1: at 18:09

## 2017-06-28 RX ADMIN — Medication 5 MILLIGRAM(S): at 05:21

## 2017-06-28 RX ADMIN — ONDANSETRON 4 MILLIGRAM(S): 8 TABLET, FILM COATED ORAL at 18:10

## 2017-06-28 RX ADMIN — Medication 5 MILLIGRAM(S): at 13:16

## 2017-06-28 RX ADMIN — ONDANSETRON 4 MILLIGRAM(S): 8 TABLET, FILM COATED ORAL at 10:09

## 2017-06-28 RX ADMIN — ERTAPENEM SODIUM 100 MILLIGRAM(S): 1 INJECTION, POWDER, LYOPHILIZED, FOR SOLUTION INTRAMUSCULAR; INTRAVENOUS at 13:16

## 2017-06-28 RX ADMIN — SODIUM CHLORIDE 80 MILLILITER(S): 9 INJECTION INTRAMUSCULAR; INTRAVENOUS; SUBCUTANEOUS at 21:28

## 2017-06-28 RX ADMIN — Medication 50 GRAM(S): at 10:09

## 2017-06-28 RX ADMIN — Medication 5 MILLIGRAM(S): at 23:56

## 2017-06-28 RX ADMIN — SODIUM CHLORIDE 80 MILLILITER(S): 9 INJECTION INTRAMUSCULAR; INTRAVENOUS; SUBCUTANEOUS at 05:34

## 2017-06-28 RX ADMIN — MORPHINE SULFATE 4 MILLIGRAM(S): 50 CAPSULE, EXTENDED RELEASE ORAL at 05:21

## 2017-06-28 RX ADMIN — Medication 1: at 23:56

## 2017-06-28 RX ADMIN — MORPHINE SULFATE 4 MILLIGRAM(S): 50 CAPSULE, EXTENDED RELEASE ORAL at 06:16

## 2017-06-28 RX ADMIN — Medication 5 MILLIGRAM(S): at 18:09

## 2017-06-28 RX ADMIN — PANTOPRAZOLE SODIUM 40 MILLIGRAM(S): 20 TABLET, DELAYED RELEASE ORAL at 18:09

## 2017-06-28 RX ADMIN — MORPHINE SULFATE 4 MILLIGRAM(S): 50 CAPSULE, EXTENDED RELEASE ORAL at 21:20

## 2017-06-28 RX ADMIN — MORPHINE SULFATE 2 MILLIGRAM(S): 50 CAPSULE, EXTENDED RELEASE ORAL at 10:16

## 2017-06-28 RX ADMIN — ONDANSETRON 4 MILLIGRAM(S): 8 TABLET, FILM COATED ORAL at 05:32

## 2017-06-28 RX ADMIN — MORPHINE SULFATE 4 MILLIGRAM(S): 50 CAPSULE, EXTENDED RELEASE ORAL at 21:50

## 2017-06-28 RX ADMIN — PANTOPRAZOLE SODIUM 40 MILLIGRAM(S): 20 TABLET, DELAYED RELEASE ORAL at 06:29

## 2017-06-28 NOTE — CONSULT NOTE ADULT - ASSESSMENT
83 y/o female with hx of AFib , CHF (preserved EF) presented with nausea x 2 weeks and elevated TNI  Cathi in hospital was mildly elevated and flat (similar to prior Cathi drawn in the past)   No new EKG changes  Doubt ACS   Will obtain echo to evaluate RWMA and valvular disease  AFib rate controlled, can be back on anticoagulation eventually (if no intervention needed during the hospitalization)   Continue BB IV since patient is NPO for rate control

## 2017-06-28 NOTE — CONSULT NOTE ADULT - SUBJECTIVE AND OBJECTIVE BOX
CARDIOLOGY CONSULTATION NOTE ([facility])    History obtained by: Patient and medical record     obtained: No    Chief complaint:    Patient is a 84y old  Female who presents with a chief complaint of Elevated Troponin I and nausea      HPI:  84 years old female with PMH of LUIS ANTONIO Carrion on Coumadin, reported history of CHF (with preserved EF as per echo in 2015), DM, HTN, HLD, Colon Cancer s/p Resection, GERD and Dementia  sent from [facility] for evaluation of elevated Troponin I. As per patient, she is having nausea and vomiting for last 2 weeks and her appetite has decreased. She has had multiple episodes of vomiting associated with abdominal discomfort - mostly in epigastric region. She was worked up in the hospital for abdominal  pain and CT abdomen showed possible free air but no intervention is done ( not a surgical abdomen) and GI placed patient on IV PPI. Patient denies chest pain or SOB, no palpitations, dizziness or syncope. She has paroxysmal AFib and rate has been controlled. She was not on digoxin as per records. Her lactate is elevated. EKG showed incomplete LBBB, and sinus rhythm with occasional afib run.   She had Troponin I in the nursing home  it was elevated 0.353, so she was sent to [facility]. Her was INR was also elevated 12          REVIEW OF SYMPTOMS: Cardiovascular:  no chest pain;  Respiratory: no SOB  Genitourinary:  No dysuria, no hematuria; Gastrointestinal:  + nausea.  No abdominal pain. No dark color stool, no melena ; Neurological: No headache, no dizziness, no slurred speech;  Psychiatric: No agitation, no anxiety.  ALL OTHER REVIEW OF SYSTEMS ARE NEGATIVE.    ALLERGIES: Allergies    penicillin (Hives)    Intolerances        MEDICATIONS  (STANDING):  sodium chloride 0.9%. 1000 milliLiter(s) (80 mL/Hr) IV Continuous <Continuous>  insulin lispro (HumaLOG) corrective regimen sliding scale   SubCutaneous every 6 hours  dextrose 5%. 1000 milliLiter(s) (50 mL/Hr) IV Continuous <Continuous>  dextrose 50% Injectable 12.5 Gram(s) IV Push once  dextrose 50% Injectable 25 Gram(s) IV Push once  dextrose 50% Injectable 25 Gram(s) IV Push once  ertapenem  IVPB 500 milliGRAM(s) IV Intermittent every 24 hours  pantoprazole  Injectable 40 milliGRAM(s) IV Push two times a day  metoprolol Injectable 5 milliGRAM(s) IV Push every 6 hours    MEDICATIONS  (PRN):  morphine  - Injectable 2 milliGRAM(s) IV Push every 4 hours PRN Moderate Pain (4 - 6)  morphine  - Injectable 4 milliGRAM(s) IV Push every 4 hours PRN Severe Pain (7 - 10)  dextrose Gel 1 Dose(s) Oral once PRN Blood Glucose LESS THAN 70 milliGRAM(s)/deciLiter  glucagon  Injectable 1 milliGRAM(s) IntraMuscular once PRN Glucose <70 milliGRAM(s)/deciLiter  ondansetron Injectable 4 milliGRAM(s) IV Push every 4 hours PRN Nausea and/or Vomiting      PAST MEDICAL HISTORY  HLD (hyperlipidemia)  Colon cancer  GERD (gastroesophageal reflux disease)  Atrial fibrillation  Diabetes  Hypertension      PAST SURGICAL HISTORY  S/P hip replacement, right  H/O exploratory laparotomy  History of appendectomy  No significant past surgical history      FAMILY HISTORY:  Family history of diabetes mellitus (Father)      SOCIAL HISTORY:  Denies smoking/alcohol/drugs      Vital Signs Last 24 Hrs  T(C): 36.9 (28 Jun 2017 07:53), Max: 36.9 (28 Jun 2017 07:53)  T(F): 98.4 (28 Jun 2017 07:53), Max: 98.4 (28 Jun 2017 07:53)  HR: 71 (28 Jun 2017 07:53) (71 - 89)  BP: 157/86 (28 Jun 2017 07:53) (145/75 - 180/94)  BP(mean): --  RR: 20 (28 Jun 2017 07:53) (18 - 20)  SpO2: 98% (28 Jun 2017 05:08) (96% - 98%)      PHYSICAL EXAM:   Constitutional: +nauseating, and vomited once during the encounter.   HEENT: Atraumatic and normcephalic , neck is supple . no JVD. No carotid bruit.   CNS: A&Ox3. No focal deficits.  Lymph Nodes: Cervical : Not palpable.  Respiratory: LLL crackles and diminished air entry  Cardiovascular: S1S2 RRR.+ PSM 2/6 over the apex (likely MR)   Gastrointestinal: Soft non-tender and non distended . +Bowel sounds, no HSM  Extremities: + edema , pulses +1 b/l   Psychiatric: Calm . no agitation., mood appropriate  Skin: No skin lesions    Intake and output:   06-27 @ 07:01  -  06-28 @ 07:00  --------------------------------------------------------  IN: 960 mL / OUT: 0 mL / NET: 960 mL        LABS:                        9.5    7.8   )-----------( 412      ( 28 Jun 2017 06:03 )             30.8     06-28    135  |  96<L>  |  34.0<H>  ----------------------------<  194<H>  4.5   |  22.0  |  1.59<H>    Ca    7.6<L>      28 Jun 2017 06:03  Phos  4.0     06-27  Mg     1.4     06-27    TPro  6.8  /  Alb  3.3  /  TBili  0.7  /  DBili  x   /  AST  29  /  ALT  15  /  AlkPhos  74  06-26    CARDIAC MARKERS ( 27 Jun 2017 08:22 )  x     / 0.08 ng/mL / 139 U/L / x     / x      CARDIAC MARKERS ( 26 Jun 2017 15:52 )  x     / 0.10 ng/mL / 102 U/L / x     / x        ;p-BNP=  PT/INR - ( 28 Jun 2017 06:03 )   PT: 18.8 sec;   INR: 1.69 ratio         PTT - ( 26 Jun 2017 15:52 )  PTT:49.3 sec      INTERPRETATION OF TELEMETRY:  ECG: Sinus rhythm, incomplete LBBB     RADIOLOGY & ADDITIONAL STUDIES:    X-ray:  Right plerual effusion    ECHO FINDINGS:     STRESS  FINDINGS:     Catheterization Findings CARDIOLOGY CONSULTATION NOTE ([facility])    History obtained by: Patient and medical record     obtained: No    Chief complaint:    Patient is a 84y old  Female who presents with a chief complaint of Elevated Troponin I and nausea      HPI:  84 years old female with PMH of LUIS ANTONIO Carrion on Coumadin, reported history of CHF (with preserved EF as per echo in 2015), DM, HTN, HLD, Colon Cancer s/p Resection, GERD and Dementia  sent from [facility] for evaluation of elevated Troponin I. As per patient, she is having nausea and vomiting for last 2 weeks and her appetite has decreased. She has had multiple episodes of vomiting associated with abdominal discomfort - mostly in epigastric region. She was worked up in the hospital for abdominal  pain and CT abdomen showed possible free air but no intervention is done ( not a surgical abdomen) and GI placed patient on IV PPI. Patient denies chest pain or SOB, no palpitations, dizziness or syncope. She has paroxysmal AFib and rate has been controlled. She was not on digoxin as per records. Her lactate is elevated. EKG showed incomplete LBBB, and sinus rhythm with occasional afib run.   She had Troponin I in the nursing home  it was elevated 0.353, so she was sent to [facility]. Her was INR was also elevated 12          REVIEW OF SYMPTOMS: Cardiovascular:  no chest pain;  Respiratory: no SOB  Genitourinary:  No dysuria, no hematuria; Gastrointestinal:  + nausea.  No abdominal pain. No dark color stool, no melena ; Neurological: No headache, no dizziness, no slurred speech;  Psychiatric: No agitation, no anxiety.  ALL OTHER REVIEW OF SYSTEMS ARE NEGATIVE.    ALLERGIES: Allergies    penicillin (Hives)    Intolerances        MEDICATIONS  (STANDING):  sodium chloride 0.9%. 1000 milliLiter(s) (80 mL/Hr) IV Continuous <Continuous>  insulin lispro (HumaLOG) corrective regimen sliding scale   SubCutaneous every 6 hours  dextrose 5%. 1000 milliLiter(s) (50 mL/Hr) IV Continuous <Continuous>  dextrose 50% Injectable 12.5 Gram(s) IV Push once  dextrose 50% Injectable 25 Gram(s) IV Push once  dextrose 50% Injectable 25 Gram(s) IV Push once  ertapenem  IVPB 500 milliGRAM(s) IV Intermittent every 24 hours  pantoprazole  Injectable 40 milliGRAM(s) IV Push two times a day  metoprolol Injectable 5 milliGRAM(s) IV Push every 6 hours    MEDICATIONS  (PRN):  morphine  - Injectable 2 milliGRAM(s) IV Push every 4 hours PRN Moderate Pain (4 - 6)  morphine  - Injectable 4 milliGRAM(s) IV Push every 4 hours PRN Severe Pain (7 - 10)  dextrose Gel 1 Dose(s) Oral once PRN Blood Glucose LESS THAN 70 milliGRAM(s)/deciLiter  glucagon  Injectable 1 milliGRAM(s) IntraMuscular once PRN Glucose <70 milliGRAM(s)/deciLiter  ondansetron Injectable 4 milliGRAM(s) IV Push every 4 hours PRN Nausea and/or Vomiting      PAST MEDICAL HISTORY  HLD (hyperlipidemia)  Colon cancer  GERD (gastroesophageal reflux disease)  Atrial fibrillation  Diabetes  Hypertension      PAST SURGICAL HISTORY  S/P hip replacement, right  H/O exploratory laparotomy  History of appendectomy  No significant past surgical history      FAMILY HISTORY:  Family history of diabetes mellitus (Father)      SOCIAL HISTORY:  Denies smoking/alcohol/drugs      Vital Signs Last 24 Hrs  T(C): 36.9 (28 Jun 2017 07:53), Max: 36.9 (28 Jun 2017 07:53)  T(F): 98.4 (28 Jun 2017 07:53), Max: 98.4 (28 Jun 2017 07:53)  HR: 71 (28 Jun 2017 07:53) (71 - 89)  BP: 157/86 (28 Jun 2017 07:53) (145/75 - 180/94)  BP(mean): --  RR: 20 (28 Jun 2017 07:53) (18 - 20)  SpO2: 98% (28 Jun 2017 05:08) (96% - 98%)      PHYSICAL EXAM:   Constitutional: +nauseating, and vomited once during the encounter.   HEENT: Atraumatic and normcephalic , neck is supple . no JVD. No carotid bruit.   CNS: A&Ox3. No focal deficits.  Lymph Nodes: Cervical : Not palpable.  Respiratory: LLL crackles and diminished air entry  Cardiovascular: S1S2 RRR.+ PSM 2/6 over the apex (likely MR)   Gastrointestinal: Soft non-tender and non distended . +Bowel sounds, no HSM  Extremities: + edema , pulses +1 b/l   Psychiatric: Calm . no agitation., mood appropriate  Skin: No skin lesions    Intake and output:   06-27 @ 07:01  -  06-28 @ 07:00  --------------------------------------------------------  IN: 960 mL / OUT: 0 mL / NET: 960 mL        LABS:                        9.5    7.8   )-----------( 412      ( 28 Jun 2017 06:03 )             30.8     06-28    135  |  96<L>  |  34.0<H>  ----------------------------<  194<H>  4.5   |  22.0  |  1.59<H>    Ca    7.6<L>      28 Jun 2017 06:03  Phos  4.0     06-27  Mg     1.4     06-27    TPro  6.8  /  Alb  3.3  /  TBili  0.7  /  DBili  x   /  AST  29  /  ALT  15  /  AlkPhos  74  06-26    CARDIAC MARKERS ( 27 Jun 2017 08:22 )  x     / 0.08 ng/mL / 139 U/L / x     / x      CARDIAC MARKERS ( 26 Jun 2017 15:52 )  x     / 0.10 ng/mL / 102 U/L / x     / x        ;p-BNP=  PT/INR - ( 28 Jun 2017 06:03 )   PT: 18.8 sec;   INR: 1.69 ratio         PTT - ( 26 Jun 2017 15:52 )  PTT:49.3 sec      INTERPRETATION OF TELEMETRY:  ECG: Sinus rhythm, incomplete LBBB     RADIOLOGY & ADDITIONAL STUDIES:    X-ray:  Right plerual effusion    ECHO FINDINGS:      IMPRESSION:  Summary:   1. Endocardial visualization was enhanced with intravenous echo contrast.   2. Normal global left ventricular systolic function.   3. Left ventricular ejection fraction, by visual estimation, is 65 to   70%.   4. Normal left ventricular internal cavity size.   5. The mitral in-flow pattern reveals no discernable A-wave, therefore   no comment on diastolic function can be made.   6. Mild to moderately enlarged left atrium.   7. Thickening and calcification of the anterior and posterior mitral   valve leaflets.   8. Moderate mitral annular calcification.   9. Mild mitral valve regurgitation.  10. Sclerotic aortic valve with normal opening.  11. Estimated pulmonary artery systolic pressure is 47.2 mmHg assuming a   right atrial pressure of 8 mmHg, which is consistent with mild pulmonary   hypertension.  12. There is no evidence of pericardial effusion.

## 2017-06-28 NOTE — PHYSICAL THERAPY INITIAL EVALUATION ADULT - ADDITIONAL COMMENTS
Per H&P pt is coming from rehab facility, however due to impaired cognition, pt unable to recall if she is , where she lives and who she lives with.

## 2017-06-28 NOTE — PROGRESS NOTE ADULT - SUBJECTIVE AND OBJECTIVE BOX
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

## 2017-06-28 NOTE — PROGRESS NOTE ADULT - SUBJECTIVE AND OBJECTIVE BOX
SUBJECTIVE    REVIEW OF SYSTEMS    General: Not in any pain	    Skin/Breast: No rash  	  ENMT: No visual problems, no sore throat	    Respiratory and Thorax: No cough, No CP, No SOB  	  Cardiovascular: No CP, No palpitations    Gastrointestinal: No Abd pain, No N/V/D    Musculoskeletal: No Joint pain, No back pain	    Neurological: No headache    Psychiatric: No anxiety      OBJECTIVE    Vital Signs Last 24 Hrs  T(C): 36.6 (06-28-17 @ 21:28), Max: 36.9 (06-28-17 @ 07:53)  T(F): 97.8 (06-28-17 @ 21:28), Max: 98.4 (06-28-17 @ 07:53)  HR: 87 (06-28-17 @ 21:28) (71 - 89)  BP: 147/93 (06-28-17 @ 21:28) (145/75 - 176/101)  BP(mean): --  RR: 18 (06-28-17 @ 21:28) (18 - 20)  SpO2: 97% (06-28-17 @ 21:28) (95% - 98%)    PHYSICAL EXAM:    Constitutional: Not in any distress    Eyes: No conjunctival injection    ENMT: No oral lesions    Neck: No nodes, no adenopathy    Back: Straight, no defects    Respiratory: clear b/l    Cardiovascular: RRR, no murmur    Gastrointestinal: soft, NT, ND    Extremities: No edema, no erythema    Neurological: no focal deficit    Skin: No rash      MEDICATIONS  (STANDING):  sodium chloride 0.9%. 1000 milliLiter(s) (80 mL/Hr) IV Continuous <Continuous>  insulin lispro (HumaLOG) corrective regimen sliding scale   SubCutaneous every 6 hours  dextrose 5%. 1000 milliLiter(s) (50 mL/Hr) IV Continuous <Continuous>  dextrose 50% Injectable 12.5 Gram(s) IV Push once  dextrose 50% Injectable 25 Gram(s) IV Push once  dextrose 50% Injectable 25 Gram(s) IV Push once  ertapenem  IVPB 500 milliGRAM(s) IV Intermittent every 24 hours  pantoprazole  Injectable 40 milliGRAM(s) IV Push two times a day  metoprolol Injectable 5 milliGRAM(s) IV Push every 6 hours    MEDICATIONS  (PRN):  morphine  - Injectable 2 milliGRAM(s) IV Push every 4 hours PRN Moderate Pain (4 - 6)  morphine  - Injectable 4 milliGRAM(s) IV Push every 4 hours PRN Severe Pain (7 - 10)  dextrose Gel 1 Dose(s) Oral once PRN Blood Glucose LESS THAN 70 milliGRAM(s)/deciLiter  glucagon  Injectable 1 milliGRAM(s) IntraMuscular once PRN Glucose <70 milliGRAM(s)/deciLiter  ondansetron Injectable 4 milliGRAM(s) IV Push every 4 hours PRN Nausea and/or Vomiting    CARDIAC MARKERS ( 27 Jun 2017 08:22 )  x     / 0.08 ng/mL / 139 U/L / x     / x                                9.5    7.8   )-----------( 412      ( 28 Jun 2017 06:03 )             30.8     28 Jun 2017 06:03    135    |  96     |  34.0   ----------------------------<  194    4.5     |  22.0   |  1.59     Ca    7.6        28 Jun 2017 06:03  Phos  4.0       27 Jun 2017 08:22  Mg     1.4       27 Jun 2017 08:22      Allergies    penicillin (Hives)    Intolerances

## 2017-06-28 NOTE — PROGRESS NOTE ADULT - ASSESSMENT
IMPRESSION:  84 years old female with PMH of A. Murali on Coumadin, CHF, DM, HTN, HLD, Colon Cancer s/p Resection, GERD and Dementia  sent from [facility] for evaluation of elevated Troponin I. As per patient, she is having nausea and vomiting for last 2 weeks and her appetite has decreased. She has had multiple episodes of vomiting associated with abdominal discomfort - mostly in epigastric region. Denies abdominal distention, black stool or blood in vomitus. In ER, she had CT Abd/Pelvis which showed suspected perforation of stomach or duodenum. No evidence of acute abdomen.    PLAN:  NPO except meds  IV PPI bid  IV zofran PRN  f/u surgery  no EGD for now (risk of mina perforation)

## 2017-06-28 NOTE — PROGRESS NOTE ADULT - SUBJECTIVE AND OBJECTIVE BOX
INTERVAL HPI/OVERNIGHT EVENTS:  Patient seen and examined    MEDICATIONS  (STANDING):  sodium chloride 0.9%. 1000 milliLiter(s) (80 mL/Hr) IV Continuous <Continuous>  insulin lispro (HumaLOG) corrective regimen sliding scale   SubCutaneous every 6 hours  dextrose 5%. 1000 milliLiter(s) (50 mL/Hr) IV Continuous <Continuous>  dextrose 50% Injectable 12.5 Gram(s) IV Push once  dextrose 50% Injectable 25 Gram(s) IV Push once  dextrose 50% Injectable 25 Gram(s) IV Push once  ertapenem  IVPB 500 milliGRAM(s) IV Intermittent every 24 hours  pantoprazole  Injectable 40 milliGRAM(s) IV Push two times a day  metoprolol Injectable 5 milliGRAM(s) IV Push every 6 hours    MEDICATIONS  (PRN):  morphine  - Injectable 2 milliGRAM(s) IV Push every 4 hours PRN Moderate Pain (4 - 6)  morphine  - Injectable 4 milliGRAM(s) IV Push every 4 hours PRN Severe Pain (7 - 10)  dextrose Gel 1 Dose(s) Oral once PRN Blood Glucose LESS THAN 70 milliGRAM(s)/deciLiter  glucagon  Injectable 1 milliGRAM(s) IntraMuscular once PRN Glucose <70 milliGRAM(s)/deciLiter  ondansetron Injectable 4 milliGRAM(s) IV Push every 4 hours PRN Nausea and/or Vomiting      Allergies    penicillin (Hives)    Intolerances        Vital Signs Last 24 Hrs  T(C): 36.9 (28 Jun 2017 07:53), Max: 36.9 (28 Jun 2017 07:53)  T(F): 98.4 (28 Jun 2017 07:53), Max: 98.4 (28 Jun 2017 07:53)  HR: 71 (28 Jun 2017 07:53) (71 - 89)  BP: 157/86 (28 Jun 2017 07:53) (145/75 - 175/80)  BP(mean): --  RR: 20 (28 Jun 2017 07:53) (18 - 20)  SpO2: 98% (28 Jun 2017 05:08) (97% - 98%)    PHYSICAL EXAM:  General: NAD.  CVS: S1, S2  Chest: air entry bilaterally present  Abd: BS present, soft, non-tender      LABS:                        9.5    7.8   )-----------( 412      ( 28 Jun 2017 06:03 )             30.8     06-28    135  |  96<L>  |  34.0<H>  ----------------------------<  194<H>  4.5   |  22.0  |  1.59<H>    Ca    7.6<L>      28 Jun 2017 06:03  Phos  4.0     06-27  Mg     1.4     06-27    TPro  6.8  /  Alb  3.3  /  TBili  0.7  /  DBili  x   /  AST  29  /  ALT  15  /  AlkPhos  74  06-26    PT/INR - ( 28 Jun 2017 06:03 )   PT: 18.8 sec;   INR: 1.69 ratio         PTT - ( 26 Jun 2017 15:52 )  PTT:49.3 sec    RADIOLOGY & ADDITIONAL TESTS:

## 2017-06-28 NOTE — PHYSICAL THERAPY INITIAL EVALUATION ADULT - PLANNED THERAPY INTERVENTIONS, PT EVAL
ROM/stretching/gait training/bed mobility training/balance training/strengthening/transfer training bed mobility training/gait training/balance training/strengthening/stretching/transfer training/ROM

## 2017-06-29 LAB
ANION GAP SERPL CALC-SCNC: 19 MMOL/L — HIGH (ref 5–17)
BUN SERPL-MCNC: 39 MG/DL — HIGH (ref 8–20)
CALCIUM SERPL-MCNC: 7.9 MG/DL — LOW (ref 8.6–10.2)
CHLORIDE SERPL-SCNC: 99 MMOL/L — SIGNIFICANT CHANGE UP (ref 98–107)
CO2 SERPL-SCNC: 19 MMOL/L — LOW (ref 22–29)
CREAT SERPL-MCNC: 1.49 MG/DL — HIGH (ref 0.5–1.3)
GLUCOSE SERPL-MCNC: 177 MG/DL — HIGH (ref 70–115)
HCT VFR BLD CALC: 31.7 % — LOW (ref 37–47)
HGB BLD-MCNC: 9.7 G/DL — LOW (ref 12–16)
INR BLD: 1.95 RATIO — HIGH (ref 0.88–1.16)
INR BLD: 2.43 RATIO — HIGH (ref 0.88–1.16)
MCHC RBC-ENTMCNC: 23 PG — LOW (ref 27–31)
MCHC RBC-ENTMCNC: 30.6 G/DL — LOW (ref 32–36)
MCV RBC AUTO: 75.3 FL — LOW (ref 81–99)
PLATELET # BLD AUTO: 361 K/UL — SIGNIFICANT CHANGE UP (ref 150–400)
POTASSIUM SERPL-MCNC: 4.5 MMOL/L — SIGNIFICANT CHANGE UP (ref 3.5–5.3)
POTASSIUM SERPL-SCNC: 4.5 MMOL/L — SIGNIFICANT CHANGE UP (ref 3.5–5.3)
PROTHROM AB SERPL-ACNC: 21.8 SEC — HIGH (ref 9.8–12.7)
PROTHROM AB SERPL-ACNC: 27.2 SEC — HIGH (ref 9.8–12.7)
RBC # BLD: 4.21 M/UL — LOW (ref 4.4–5.2)
RBC # FLD: 17 % — HIGH (ref 11–15.6)
SODIUM SERPL-SCNC: 137 MMOL/L — SIGNIFICANT CHANGE UP (ref 135–145)
WBC # BLD: 7.6 K/UL — SIGNIFICANT CHANGE UP (ref 4.8–10.8)
WBC # FLD AUTO: 7.6 K/UL — SIGNIFICANT CHANGE UP (ref 4.8–10.8)

## 2017-06-29 PROCEDURE — 74176 CT ABD & PELVIS W/O CONTRAST: CPT | Mod: 26

## 2017-06-29 RX ORDER — ACETAMINOPHEN 500 MG
650 TABLET ORAL EVERY 6 HOURS
Qty: 0 | Refills: 0 | Status: DISCONTINUED | OUTPATIENT
Start: 2017-06-29 | End: 2017-07-03

## 2017-06-29 RX ORDER — ACETYLCYSTEINE 200 MG/ML
1200 VIAL (ML) MISCELLANEOUS EVERY 12 HOURS
Qty: 0 | Refills: 0 | Status: COMPLETED | OUTPATIENT
Start: 2017-06-29 | End: 2017-06-30

## 2017-06-29 RX ADMIN — MORPHINE SULFATE 4 MILLIGRAM(S): 50 CAPSULE, EXTENDED RELEASE ORAL at 15:14

## 2017-06-29 RX ADMIN — Medication 1: at 18:02

## 2017-06-29 RX ADMIN — Medication 5 MILLIGRAM(S): at 05:14

## 2017-06-29 RX ADMIN — Medication 650 MILLIGRAM(S): at 17:48

## 2017-06-29 RX ADMIN — Medication 1: at 23:44

## 2017-06-29 RX ADMIN — Medication 5 MILLIGRAM(S): at 17:48

## 2017-06-29 RX ADMIN — PANTOPRAZOLE SODIUM 40 MILLIGRAM(S): 20 TABLET, DELAYED RELEASE ORAL at 05:14

## 2017-06-29 RX ADMIN — ONDANSETRON 4 MILLIGRAM(S): 8 TABLET, FILM COATED ORAL at 15:14

## 2017-06-29 RX ADMIN — Medication 5 MILLIGRAM(S): at 23:42

## 2017-06-29 RX ADMIN — MORPHINE SULFATE 4 MILLIGRAM(S): 50 CAPSULE, EXTENDED RELEASE ORAL at 19:01

## 2017-06-29 RX ADMIN — Medication 650 MILLIGRAM(S): at 18:56

## 2017-06-29 RX ADMIN — ONDANSETRON 4 MILLIGRAM(S): 8 TABLET, FILM COATED ORAL at 23:42

## 2017-06-29 RX ADMIN — MORPHINE SULFATE 4 MILLIGRAM(S): 50 CAPSULE, EXTENDED RELEASE ORAL at 05:30

## 2017-06-29 RX ADMIN — PANTOPRAZOLE SODIUM 40 MILLIGRAM(S): 20 TABLET, DELAYED RELEASE ORAL at 17:48

## 2017-06-29 RX ADMIN — Medication 5 MILLIGRAM(S): at 15:05

## 2017-06-29 RX ADMIN — MORPHINE SULFATE 4 MILLIGRAM(S): 50 CAPSULE, EXTENDED RELEASE ORAL at 05:15

## 2017-06-29 RX ADMIN — SODIUM CHLORIDE 80 MILLILITER(S): 9 INJECTION INTRAMUSCULAR; INTRAVENOUS; SUBCUTANEOUS at 09:40

## 2017-06-29 RX ADMIN — Medication 1200 MILLIGRAM(S): at 18:18

## 2017-06-29 RX ADMIN — ONDANSETRON 4 MILLIGRAM(S): 8 TABLET, FILM COATED ORAL at 19:02

## 2017-06-29 RX ADMIN — Medication 1: at 15:04

## 2017-06-29 RX ADMIN — ERTAPENEM SODIUM 100 MILLIGRAM(S): 1 INJECTION, POWDER, LYOPHILIZED, FOR SOLUTION INTRAMUSCULAR; INTRAVENOUS at 10:50

## 2017-06-29 RX ADMIN — MORPHINE SULFATE 4 MILLIGRAM(S): 50 CAPSULE, EXTENDED RELEASE ORAL at 19:53

## 2017-06-29 RX ADMIN — Medication 1: at 05:18

## 2017-06-29 RX ADMIN — MORPHINE SULFATE 4 MILLIGRAM(S): 50 CAPSULE, EXTENDED RELEASE ORAL at 15:57

## 2017-06-29 NOTE — PROGRESS NOTE ADULT - SUBJECTIVE AND OBJECTIVE BOX
SUBJECTIVE    REVIEW OF SYSTEMS    General: Not in any pain	    Skin/Breast: No rash  	  ENMT: No visual problems, no sore throat	    Respiratory and Thorax: No cough, No CP, No SOB  	  Cardiovascular: No CP, No palpitations    Gastrointestinal: No Abd pain, No N/V/D    Musculoskeletal: + right hip pain	    Neurological: No headache    Psychiatric: No anxiety      OBJECTIVE    Vital Signs Last 24 Hrs  T(C): 36.8 (06-29-17 @ 07:48), Max: 36.8 (06-29-17 @ 07:48)  T(F): 98.2 (06-29-17 @ 07:48), Max: 98.2 (06-29-17 @ 07:48)  HR: 70 (06-29-17 @ 15:31) (70 - 89)  BP: 173/93 (06-29-17 @ 15:31) (142/80 - 179/96)  BP(mean): --  RR: 18 (06-29-17 @ 15:31) (18 - 20)  SpO2: 100% (06-29-17 @ 15:31) (97% - 100%)    PHYSICAL EXAM:    Constitutional: Not in any distress    Eyes: No conjunctival injection    ENMT: No oral lesions    Neck: No nodes, no adenopathy    Back: Straight, no defects    Respiratory: clear b/l    Cardiovascular: RRR, no murmur    Gastrointestinal: soft, NT, ND    Extremities: No edema, no erythema    Neurological: no focal deficit    Skin: No rash      MEDICATIONS  (STANDING):  sodium chloride 0.9%. 1000 milliLiter(s) (80 mL/Hr) IV Continuous <Continuous>  insulin lispro (HumaLOG) corrective regimen sliding scale   SubCutaneous every 6 hours  dextrose 5%. 1000 milliLiter(s) (50 mL/Hr) IV Continuous <Continuous>  dextrose 50% Injectable 12.5 Gram(s) IV Push once  dextrose 50% Injectable 25 Gram(s) IV Push once  dextrose 50% Injectable 25 Gram(s) IV Push once  ertapenem  IVPB 500 milliGRAM(s) IV Intermittent every 24 hours  pantoprazole  Injectable 40 milliGRAM(s) IV Push two times a day  metoprolol Injectable 5 milliGRAM(s) IV Push every 6 hours  acetylcysteine  Oral Solution 1200 milliGRAM(s) Oral every 12 hours    MEDICATIONS  (PRN):  morphine  - Injectable 2 milliGRAM(s) IV Push every 4 hours PRN Moderate Pain (4 - 6)  morphine  - Injectable 4 milliGRAM(s) IV Push every 4 hours PRN Severe Pain (7 - 10)  dextrose Gel 1 Dose(s) Oral once PRN Blood Glucose LESS THAN 70 milliGRAM(s)/deciLiter  glucagon  Injectable 1 milliGRAM(s) IntraMuscular once PRN Glucose <70 milliGRAM(s)/deciLiter  ondansetron Injectable 4 milliGRAM(s) IV Push every 4 hours PRN Nausea and/or Vomiting  acetaminophen   Tablet. 650 milliGRAM(s) Oral every 6 hours PRN Moderate Pain (4 - 6)                              9.7    7.6   )-----------( 361      ( 29 Jun 2017 06:30 )             31.7     29 Jun 2017 06:30    137    |  99     |  39.0   ----------------------------<  177    4.5     |  19.0   |  1.49     Ca    7.9        29 Jun 2017 06:30      Allergies    penicillin (Hives)    Intolerances

## 2017-06-29 NOTE — PROGRESS NOTE ADULT - ASSESSMENT
IMPRESSION:  84 years old female with PMH of A. Murali on Coumadin, CHF, DM, HTN, HLD, Colon Cancer s/p Resection, GERD and Dementia  sent from [facility] for evaluation of elevated Troponin I. As per patient, she is having nausea and vomiting for last 2 weeks and her appetite has decreased. She has had multiple episodes of vomiting associated with abdominal discomfort - mostly in epigastric region. Denies abdominal distention, black stool or blood in vomitus. In ER, she had CT Abd/Pelvis which showed suspected perforation of stomach or duodenum. No evidence of acute abdomen.    PLAN:  NPO except meds  IV PPI bid  IV zofran PRN  f/u surgery  no EGD for now (risk of mina perforation)

## 2017-06-29 NOTE — PROGRESS NOTE ADULT - SUBJECTIVE AND OBJECTIVE BOX
INTERVAL HPI/OVERNIGHT EVENTS:  Patient seen and examined    MEDICATIONS  (STANDING):  sodium chloride 0.9%. 1000 milliLiter(s) (80 mL/Hr) IV Continuous <Continuous>  insulin lispro (HumaLOG) corrective regimen sliding scale   SubCutaneous every 6 hours  dextrose 5%. 1000 milliLiter(s) (50 mL/Hr) IV Continuous <Continuous>  dextrose 50% Injectable 12.5 Gram(s) IV Push once  dextrose 50% Injectable 25 Gram(s) IV Push once  dextrose 50% Injectable 25 Gram(s) IV Push once  ertapenem  IVPB 500 milliGRAM(s) IV Intermittent every 24 hours  pantoprazole  Injectable 40 milliGRAM(s) IV Push two times a day  metoprolol Injectable 5 milliGRAM(s) IV Push every 6 hours    MEDICATIONS  (PRN):  morphine  - Injectable 2 milliGRAM(s) IV Push every 4 hours PRN Moderate Pain (4 - 6)  morphine  - Injectable 4 milliGRAM(s) IV Push every 4 hours PRN Severe Pain (7 - 10)  dextrose Gel 1 Dose(s) Oral once PRN Blood Glucose LESS THAN 70 milliGRAM(s)/deciLiter  glucagon  Injectable 1 milliGRAM(s) IntraMuscular once PRN Glucose <70 milliGRAM(s)/deciLiter  ondansetron Injectable 4 milliGRAM(s) IV Push every 4 hours PRN Nausea and/or Vomiting      Allergies    penicillin (Hives)    Intolerances        Vital Signs Last 24 Hrs  T(C): 36.8 (29 Jun 2017 07:48), Max: 36.8 (29 Jun 2017 07:48)  T(F): 98.2 (29 Jun 2017 07:48), Max: 98.2 (29 Jun 2017 07:48)  HR: 77 (29 Jun 2017 07:48) (77 - 89)  BP: 168/87 (29 Jun 2017 07:48) (142/80 - 179/96)  BP(mean): --  RR: 20 (29 Jun 2017 07:48) (18 - 20)  SpO2: 98% (29 Jun 2017 05:02) (95% - 98%)    PHYSICAL EXAM:  General: NAD.  CVS: S1, S2  Chest: air entry bilaterally present  Abd: BS present, soft, non-tender      LABS:                        9.7    7.6   )-----------( 361      ( 29 Jun 2017 06:30 )             31.7     06-29    137  |  99  |  39.0<H>  ----------------------------<  177<H>  4.5   |  19.0<L>  |  1.49<H>    Ca    7.9<L>      29 Jun 2017 06:30      PT/INR - ( 29 Jun 2017 06:30 )   PT: 27.2 sec;   INR: 2.43 ratio

## 2017-06-29 NOTE — DIETITIAN INITIAL EVALUATION ADULT. - OTHER INFO
Pt admitted for persistent nausea and vomiting x 2 weeks pta. Per progress notes, probable upper GI perforation. Pt has been NPO x 3 days. Noted with 2+ pedal edema (hx of CHF). Pt/ unable to state if pt has had any weight loss. Pt at high risk for developing malnutrition.

## 2017-06-29 NOTE — DIETITIAN INITIAL EVALUATION ADULT. - ETIOLOGY
related to inability to consume sufficient energy related to alteration in gi tract structure/function

## 2017-06-29 NOTE — DIETITIAN INITIAL EVALUATION ADULT. - SIGNS/SYMPTOMS
as evidenced by NPO x 3 days in house, pt reports minimal intake x 2 weeks pta as evidenced by persistent N/V x 2 weeks due to likely upper GI perforation

## 2017-06-30 DIAGNOSIS — I25.10 ATHEROSCLEROTIC HEART DISEASE OF NATIVE CORONARY ARTERY WITHOUT ANGINA PECTORIS: ICD-10-CM

## 2017-06-30 PROCEDURE — 99221 1ST HOSP IP/OBS SF/LOW 40: CPT

## 2017-06-30 RX ORDER — METOPROLOL TARTRATE 50 MG
25 TABLET ORAL
Qty: 0 | Refills: 0 | Status: DISCONTINUED | OUTPATIENT
Start: 2017-06-30 | End: 2017-07-01

## 2017-06-30 RX ORDER — ASPIRIN/CALCIUM CARB/MAGNESIUM 324 MG
81 TABLET ORAL DAILY
Qty: 0 | Refills: 0 | Status: DISCONTINUED | OUTPATIENT
Start: 2017-06-30 | End: 2017-06-30

## 2017-06-30 RX ORDER — HYDRALAZINE HCL 50 MG
10 TABLET ORAL ONCE
Qty: 0 | Refills: 0 | Status: COMPLETED | OUTPATIENT
Start: 2017-06-30 | End: 2017-06-30

## 2017-06-30 RX ORDER — ATORVASTATIN CALCIUM 80 MG/1
10 TABLET, FILM COATED ORAL AT BEDTIME
Qty: 0 | Refills: 0 | Status: DISCONTINUED | OUTPATIENT
Start: 2017-06-30 | End: 2017-07-01

## 2017-06-30 RX ORDER — ASPIRIN/CALCIUM CARB/MAGNESIUM 324 MG
81 TABLET ORAL DAILY
Qty: 0 | Refills: 0 | Status: DISCONTINUED | OUTPATIENT
Start: 2017-06-30 | End: 2017-07-06

## 2017-06-30 RX ADMIN — PANTOPRAZOLE SODIUM 40 MILLIGRAM(S): 20 TABLET, DELAYED RELEASE ORAL at 05:57

## 2017-06-30 RX ADMIN — Medication 81 MILLIGRAM(S): at 15:51

## 2017-06-30 RX ADMIN — ATORVASTATIN CALCIUM 10 MILLIGRAM(S): 80 TABLET, FILM COATED ORAL at 21:55

## 2017-06-30 RX ADMIN — Medication 5 MILLIGRAM(S): at 12:53

## 2017-06-30 RX ADMIN — MORPHINE SULFATE 4 MILLIGRAM(S): 50 CAPSULE, EXTENDED RELEASE ORAL at 17:23

## 2017-06-30 RX ADMIN — PANTOPRAZOLE SODIUM 40 MILLIGRAM(S): 20 TABLET, DELAYED RELEASE ORAL at 17:24

## 2017-06-30 RX ADMIN — Medication 25 MILLIGRAM(S): at 15:10

## 2017-06-30 RX ADMIN — Medication 650 MILLIGRAM(S): at 21:55

## 2017-06-30 RX ADMIN — Medication 5 MILLIGRAM(S): at 06:33

## 2017-06-30 RX ADMIN — MORPHINE SULFATE 4 MILLIGRAM(S): 50 CAPSULE, EXTENDED RELEASE ORAL at 17:38

## 2017-06-30 RX ADMIN — ERTAPENEM SODIUM 100 MILLIGRAM(S): 1 INJECTION, POWDER, LYOPHILIZED, FOR SOLUTION INTRAMUSCULAR; INTRAVENOUS at 15:51

## 2017-06-30 RX ADMIN — Medication 650 MILLIGRAM(S): at 21:57

## 2017-06-30 RX ADMIN — MORPHINE SULFATE 4 MILLIGRAM(S): 50 CAPSULE, EXTENDED RELEASE ORAL at 10:02

## 2017-06-30 RX ADMIN — Medication 10 MILLIGRAM(S): at 17:01

## 2017-06-30 RX ADMIN — Medication 1200 MILLIGRAM(S): at 17:35

## 2017-06-30 RX ADMIN — ONDANSETRON 4 MILLIGRAM(S): 8 TABLET, FILM COATED ORAL at 05:50

## 2017-06-30 RX ADMIN — ONDANSETRON 4 MILLIGRAM(S): 8 TABLET, FILM COATED ORAL at 15:59

## 2017-06-30 RX ADMIN — ONDANSETRON 4 MILLIGRAM(S): 8 TABLET, FILM COATED ORAL at 20:00

## 2017-06-30 NOTE — PROGRESS NOTE ADULT - SUBJECTIVE AND OBJECTIVE BOX
INTERVAL HPI/ OVERNIGHT EVENTS: Patient seen and evaluated at bedside. No acute overnight events. Denies N/V/F/C.       MEDICATIONS  (STANDING):  sodium chloride 0.9%. 1000 milliLiter(s) (80 mL/Hr) IV Continuous <Continuous>  insulin lispro (HumaLOG) corrective regimen sliding scale   SubCutaneous every 6 hours  dextrose 5%. 1000 milliLiter(s) (50 mL/Hr) IV Continuous <Continuous>  dextrose 50% Injectable 12.5 Gram(s) IV Push once  dextrose 50% Injectable 25 Gram(s) IV Push once  dextrose 50% Injectable 25 Gram(s) IV Push once  ertapenem  IVPB 500 milliGRAM(s) IV Intermittent every 24 hours  pantoprazole  Injectable 40 milliGRAM(s) IV Push two times a day  metoprolol Injectable 5 milliGRAM(s) IV Push every 6 hours  acetylcysteine  Oral Solution 1200 milliGRAM(s) Oral every 12 hours    MEDICATIONS  (PRN):  morphine  - Injectable 2 milliGRAM(s) IV Push every 4 hours PRN Moderate Pain (4 - 6)  morphine  - Injectable 4 milliGRAM(s) IV Push every 4 hours PRN Severe Pain (7 - 10)  dextrose Gel 1 Dose(s) Oral once PRN Blood Glucose LESS THAN 70 milliGRAM(s)/deciLiter  glucagon  Injectable 1 milliGRAM(s) IntraMuscular once PRN Glucose <70 milliGRAM(s)/deciLiter  ondansetron Injectable 4 milliGRAM(s) IV Push every 4 hours PRN Nausea and/or Vomiting  acetaminophen   Tablet. 650 milliGRAM(s) Oral every 6 hours PRN Moderate Pain (4 - 6)      Vital Signs Last 24 Hrs  T(C): 36.1 (30 Jun 2017 05:37), Max: 36.9 (29 Jun 2017 23:47)  T(F): 97 (30 Jun 2017 05:37), Max: 98.4 (29 Jun 2017 23:47)  HR: 79 (30 Jun 2017 05:37) (70 - 81)  BP: 156/87 (30 Jun 2017 05:37) (156/87 - 173/93)  BP(mean): --  RR: 20 (30 Jun 2017 05:37) (18 - 20)  SpO2: 96% (30 Jun 2017 05:37) (96% - 100%)    Physical Exam:    Neurological:  No sensory/motor deficits    HEENT: PERRLA, EOMI, no drainage or redness    Neck: No bruits; no thyromegaly or nodules,  No JVD    Back: Normal spine flexure, No CVA tenderness, No deformity or limitation of movement    Respiratory: Breath Sounds equal & clear to auscultation, no accessory muscle use    Cardiovascular: Regular rate & rhythm, normal S1, S2; no murmurs, gallops or rubs    Gastrointestinal: Soft, nondistended. No rebound, guarding or rigidity.     Extremities: No peripheral edema, No cyanosis, clubbing     Vascular: Equal and normal pulses: 2+ peripheral pulses throughout    Musculoskeletal: No joint pain, swelling or deformity; no limitation of movement    Skin: No rashes      I&O's Detail    29 Jun 2017 07:01  -  30 Jun 2017 07:00  --------------------------------------------------------  IN:    sodium chloride 0.9%.: 1920 mL  Total IN: 1920 mL    OUT:  Total OUT: 0 mL    Total NET: 1920 mL          LABS:                        9.7    7.6   )-----------( 361      ( 29 Jun 2017 06:30 )             31.7     06-29    137  |  99  |  39.0<H>  ----------------------------<  177<H>  4.5   |  19.0<L>  |  1.49<H>    Ca    7.9<L>      29 Jun 2017 06:30      PT/INR - ( 29 Jun 2017 14:44 )   PT: 21.8 sec;   INR: 1.95 ratio               RADIOLOGY & ADDITIONAL STUDIES:

## 2017-06-30 NOTE — PROGRESS NOTE ADULT - SUBJECTIVE AND OBJECTIVE BOX
SUBJECTIVE    REVIEW OF SYSTEMS    General: Not in any pain	    Skin/Breast: No rash  	  ENMT: No visual problems, no sore throat	    Respiratory and Thorax: No cough, No CP, No SOB  	  Cardiovascular: No CP, No palpitations    Gastrointestinal: No Abd pain, No N/V/D    Musculoskeletal: No Joint pain, No back pain	    Neurological: No headache    Psychiatric: No anxiety      OBJECTIVE    Vital Signs Last 24 Hrs  T(C): 36.4 (06-30-17 @ 10:50), Max: 36.9 (06-29-17 @ 23:47)  T(F): 97.6 (06-30-17 @ 10:50), Max: 98.4 (06-29-17 @ 23:47)  HR: 79 (06-30-17 @ 17:50) (57 - 100)  BP: 136/64 (06-30-17 @ 17:50) (136/64 - 184/99)  BP(mean): --  RR: 18 (06-30-17 @ 17:50) (8 - 20)  SpO2: 99% (06-30-17 @ 17:50) (96% - 100%)    PHYSICAL EXAM:    Constitutional: Not in any distress    Eyes: No conjunctival injection    ENMT: No oral lesions    Neck: No nodes, no adenopathy    Back: Straight, no defects    Respiratory: clear b/l    Cardiovascular: RRR, no murmur    Gastrointestinal: soft, NT, ND    Extremities: No edema, no erythema    Neurological: no focal deficit    Skin: No rash      MEDICATIONS  (STANDING):  sodium chloride 0.9%. 1000 milliLiter(s) (80 mL/Hr) IV Continuous <Continuous>  insulin lispro (HumaLOG) corrective regimen sliding scale   SubCutaneous every 6 hours  dextrose 5%. 1000 milliLiter(s) (50 mL/Hr) IV Continuous <Continuous>  dextrose 50% Injectable 12.5 Gram(s) IV Push once  dextrose 50% Injectable 25 Gram(s) IV Push once  dextrose 50% Injectable 25 Gram(s) IV Push once  pantoprazole  Injectable 40 milliGRAM(s) IV Push two times a day  metoprolol Injectable 5 milliGRAM(s) IV Push every 6 hours  atorvastatin 10 milliGRAM(s) Oral at bedtime  metoprolol 25 milliGRAM(s) Oral two times a day  aspirin  chewable 81 milliGRAM(s) Oral daily    MEDICATIONS  (PRN):  morphine  - Injectable 2 milliGRAM(s) IV Push every 4 hours PRN Moderate Pain (4 - 6)  morphine  - Injectable 4 milliGRAM(s) IV Push every 4 hours PRN Severe Pain (7 - 10)  dextrose Gel 1 Dose(s) Oral once PRN Blood Glucose LESS THAN 70 milliGRAM(s)/deciLiter  glucagon  Injectable 1 milliGRAM(s) IntraMuscular once PRN Glucose <70 milliGRAM(s)/deciLiter  ondansetron Injectable 4 milliGRAM(s) IV Push every 4 hours PRN Nausea and/or Vomiting  acetaminophen   Tablet. 650 milliGRAM(s) Oral every 6 hours PRN Moderate Pain (4 - 6)                              9.7    7.6   )-----------( 361      ( 29 Jun 2017 06:30 )             31.7     29 Jun 2017 06:30    137    |  99     |  39.0   ----------------------------<  177    4.5     |  19.0   |  1.49     Ca    7.9        29 Jun 2017 06:30      Allergies    penicillin (Hives)    Intolerances

## 2017-06-30 NOTE — PROGRESS NOTE ADULT - PROBLEM SELECTOR PLAN 1
s/p cath showing 3 vessel disease; pt not a candidate for surgery right now, Dr Mensah's eval appreciated; Dr Kessler assistance appreciated; medical mgmt

## 2017-06-30 NOTE — PROGRESS NOTE ADULT - PROBLEM SELECTOR PLAN 1
3VD with EF 20 %   recommend surgical evaluation   reviewed with Dr Dominique Trinidad   start low dose satin   start [id number] mg po bid   progress to clear liquids   post procedure vss   monitor groin , neuro   transfer to [facility] floor 3VD with EF 20 %   recommend surgical evaluation   reviewed with Dr Dominique Trinidad   start low dose satin   start [id number] mg po bid   progress to clear liquids   post procedure vss   monitor groin , neuro   transfer to Tele floor  will start ASA  pending CABG evaluation

## 2017-06-30 NOTE — PROGRESS NOTE ADULT - ASSESSMENT
59 y/o M w/ duodenal perforation   Plan:  A/P 83yo F with likely walled off foregut perforation, stable for now  - no acute surgical intervention  - serial abdominal exams  - will discuss with attending

## 2017-06-30 NOTE — CONSULT NOTE ADULT - ASSESSMENT
85 y/o female nursing home resident with history of A. Fib, CHF, DM, HTN, HLD, Colon Cancer s/p Resection, GERD, Dementia, and deconditioned, presents with elevated troponin, INR, nausea and vomiting. Abdominal initial CT revealed free air, repeat CT revealed no free air. pt has been NPO, followed by GI and surgery. Cardiac w/o revealed EF 20%, and 3 vessel CAD, no cardiac symptoms. The patient's current symptomology is unlikely caused by her CAD. Would hold off any cardiac surgical intervention at this time, will follow. Discussed with Dr. Mensah. 85 y/o female nursing home resident with history of A. Fib, CHF, DM, HTN, HLD, Colon Cancer s/p Resection, GERD, Dementia, and deconditioned, presents with elevated troponin, INR, nausea and vomiting. Abdominal initial CT revealed free air, repeat CT revealed no free air. pt has been NPO, followed by GI and surgery. Cardiac w/o revealed EF 20%, and 3 vessel CAD, no cardiac symptoms. The patient's current symptomology is unlikely caused by her CAD. Would maximize medical therapy and hold off any cardiac surgical intervention at this time, will follow. Discussed with Dr. Mensah. 85 y/o female nursing home resident with history of A. Fib, CHF, DM, HTN, HLD, Colon Cancer s/p Resection, GERD, Dementia, and deconditioned, presents with elevated troponin, INR, nausea and vomiting. Abdominal initial CT revealed free air, repeat CT revealed no free air with stable right pleural effusion, pt has been NPO, followed by GI and surgery, on abx. Cardiac w/o revealed EF 20%, and 3 vessel CAD, no cardiac symptoms. Would maximize medical therapy and hold off any cardiac surgical intervention at this time, will follow. Discussed with Dr. Mensah.

## 2017-06-30 NOTE — PROGRESS NOTE ADULT - PROBLEM SELECTOR PLAN 4
possible esophageal/stomach pathology, pt not stable enough for endoscopy; considering palliative consult

## 2017-06-30 NOTE — CONSULT NOTE ADULT - SUBJECTIVE AND OBJECTIVE BOX
84 years old female with PMH of A. Fib on Coumadin, CHF, DM, HTN, HLD, Colon Cancer s/p Resection, GERD and Dementia sent from [facility] for evaluation of elevated Troponin, and INR. Pt was having nausea  and vomiting and elevated INR and was stabilized over  the last several days. Initial CT abd showed free air, repeat CT abd revealed no free air. Echo revealed decreased Ef  of 20% was recommended to have a C  for evaluation and suspicion for blockage- cath revealed 3VD. The patient denies any chest pain, SOB, palpitation, diaphoresis, dizziness, or syncope.  Pt had a hip replacement 4 years ago, was able to walk up to 1 year ago. Patient has been a resident of [facility] for one year, pt is able to ambulate only with assistance and with a walker. The patient has a history of dementia and is mildly confused.    PMH/PSH:  HLD (hyperlipidemia)  Colon cancer  GERD (gastroesophageal reflux disease)  Atrial fibrillation  Diabetes  Hypertension    S/P hip replacement, right  H/O exploratory laparotomy: for colon cancer  History of appendectomy  No significant past surgical history    Relevant Family History  FAMILY HISTORY:  Family history of diabetes mellitus (Father), brother has CAD s/p CABG.    SOCIAL HISTORY:      MEDICATIONS  (STANDING):  sodium chloride 0.9%. 1000 milliLiter(s) (80 mL/Hr) IV Continuous <Continuous>  insulin lispro (HumaLOG) corrective regimen sliding scale   SubCutaneous every 6 hours  dextrose 5%. 1000 milliLiter(s) (50 mL/Hr) IV Continuous <Continuous>  dextrose 50% Injectable 12.5 Gram(s) IV Push once  dextrose 50% Injectable 25 Gram(s) IV Push once  dextrose 50% Injectable 25 Gram(s) IV Push once  ertapenem  IVPB 500 milliGRAM(s) IV Intermittent every 24 hours  pantoprazole  Injectable 40 milliGRAM(s) IV Push two times a day  metoprolol Injectable 5 milliGRAM(s) IV Push every 6 hours  acetylcysteine  Oral Solution 1200 milliGRAM(s) Oral every 12 hours  atorvastatin 10 milliGRAM(s) Oral at bedtime  metoprolol 25 milliGRAM(s) Oral two times a day  aspirin  chewable 81 milliGRAM(s) Oral daily    MEDICATIONS  (PRN):  morphine  - Injectable 2 milliGRAM(s) IV Push every 4 hours PRN Moderate Pain (4 - 6)  morphine  - Injectable 4 milliGRAM(s) IV Push every 4 hours PRN Severe Pain (7 - 10)  dextrose Gel 1 Dose(s) Oral once PRN Blood Glucose LESS THAN 70 milliGRAM(s)/deciLiter  glucagon  Injectable 1 milliGRAM(s) IntraMuscular once PRN Glucose <70 milliGRAM(s)/deciLiter  ondansetron Injectable 4 milliGRAM(s) IV Push every 4 hours PRN Nausea and/or Vomiting  acetaminophen   Tablet. 650 milliGRAM(s) Oral every 6 hours PRN Moderate Pain (4 - 6)    Antiplatelet therapy:  Coumadin, on hold due to elevated INR    Allergies: penicillin (Hives)                                                          LABS:                        9.7    7.6   )-----------( 361      ( 29 Jun 2017 06:30 )             31.7     06-29    137  |  99  |  39.0<H>  ----------------------------<  177<H>  4.5   |  19.0<L>  |  1.49<H>    Ca    7.9<L>      29 Jun 2017 06:30    PT/INR - ( 29 Jun 2017 14:44 )   PT: 21.8 sec;   INR: 1.95 ratio      Cardiac Cath:  30 June 2017: 3VD, Official Report pending    TTE / RAMONITA:  < from: TTE Echo Complete w/Doppler (06.28.17 @ 11:25) >   Summary:   1. Technically good study.   2. Endocardial visualization was enhanced with intravenous echo contrast.   3. Severely decreased global left ventricular systolic function.   4. Left ventricular ejection fraction, by visual estimation, is <20%.   5. The left ventricular diastolic function could not be assessed in this   study.   6. Normal right ventricular size and function.   7. Severely enlarged left atrium.   8. Moderately dilated right atrium.   9. Thickening of the anterior and posterior mitral valveleaflets.  10. Mild mitral valve regurgitation.  11. Sclerotic aortic valve with normal opening.  12. Moderate tricuspid regurgitation.  13. Estimated pulmonary artery systolic pressure is 48.6 mmHg assuming a   right atrial pressure of 15 mmHg, which is consistent with mild pulmonary   hypertension.  14. There is no evidence of pericardial effusion.  15. Findings were D/W Dr. Carrizales and Dr. Fox.    < end of copied text >    Review of Systems       See HPI    T(C): 36.4 (06-30-17 @ 10:50), Max: 36.9 (06-29-17 @ 23:47)  HR: 69 (06-30-17 @ 14:50) (65 - 100)  BP: 173/89 (06-30-17 @ 14:50) (156/87 - 184/99)  RR: 16 (06-30-17 @ 14:50) (8 - 20)  SpO2: 100% (06-30-17 @ 14:50) (96% - 100%)    Physical Exam  General: 85 y/o female, lying in bed s/p cardiac cath, NAD                                                         Neuro: Awake and alert                   Eyes: PERRL, EOMI .   Neck: supple, no JVD, trachea midline, no bruits  Chest: CTA, equal bilaterally, no wheezes/rales/rhonchi, normal excursion, no accessory muscle use note  CV: RRR, +S1S2  GI: soft, NT, ND, +BS  Extremities: SALINAS x 4, 3+ pitted edema to b/l lower extremities  SKIN: warm, dry, intact 84 years old female with PMH of A. Fib on Coumadin, CHF, DM, HTN, HLD, Colon Cancer s/p Resection, GERD and Dementia sent from [facility] for evaluation of elevated Troponin, and INR. Pt was having nausea  and vomiting and elevated INR and was stabilized over  the last several days. Initial CT abd showed free air, repeat CT abd revealed no free air, stable right pleural effusion. Echo revealed decreased Ef  of 20% was recommended to have a [facility]  for evaluation and suspicion for blockage- cath revealed 3VD. The patient denies any chest pain, SOB, palpitation, diaphoresis, dizziness, or syncope.  Pt had a hip replacement 4 years ago, was able to walk up to 1 year ago. Patient has been a resident of [facility] for one year, pt is able to ambulate only with assistance and with a walker. The patient has a history of dementia and is mildly confused.    PMH/PSH:  HLD (hyperlipidemia)  Colon cancer  GERD (gastroesophageal reflux disease)  Atrial fibrillation  Diabetes  Hypertension    S/P hip replacement, right  H/O exploratory laparotomy: for colon cancer  History of appendectomy  No significant past surgical history    Relevant Family History  FAMILY HISTORY:  Family history of diabetes mellitus (Father), brother has CAD s/p CABG.    SOCIAL HISTORY:      MEDICATIONS  (STANDING):  sodium chloride 0.9%. 1000 milliLiter(s) (80 mL/Hr) IV Continuous <Continuous>  insulin lispro (HumaLOG) corrective regimen sliding scale   SubCutaneous every 6 hours  dextrose 5%. 1000 milliLiter(s) (50 mL/Hr) IV Continuous <Continuous>  dextrose 50% Injectable 12.5 Gram(s) IV Push once  dextrose 50% Injectable 25 Gram(s) IV Push once  dextrose 50% Injectable 25 Gram(s) IV Push once  ertapenem  IVPB 500 milliGRAM(s) IV Intermittent every 24 hours  pantoprazole  Injectable 40 milliGRAM(s) IV Push two times a day  metoprolol Injectable 5 milliGRAM(s) IV Push every 6 hours  acetylcysteine  Oral Solution 1200 milliGRAM(s) Oral every 12 hours  atorvastatin 10 milliGRAM(s) Oral at bedtime  metoprolol 25 milliGRAM(s) Oral two times a day  aspirin  chewable 81 milliGRAM(s) Oral daily    MEDICATIONS  (PRN):  morphine  - Injectable 2 milliGRAM(s) IV Push every 4 hours PRN Moderate Pain (4 - 6)  morphine  - Injectable 4 milliGRAM(s) IV Push every 4 hours PRN Severe Pain (7 - 10)  dextrose Gel 1 Dose(s) Oral once PRN Blood Glucose LESS THAN 70 milliGRAM(s)/deciLiter  glucagon  Injectable 1 milliGRAM(s) IntraMuscular once PRN Glucose <70 milliGRAM(s)/deciLiter  ondansetron Injectable 4 milliGRAM(s) IV Push every 4 hours PRN Nausea and/or Vomiting  acetaminophen   Tablet. 650 milliGRAM(s) Oral every 6 hours PRN Moderate Pain (4 - 6)    Antiplatelet therapy:  Coumadin, on hold due to elevated INR    Allergies: penicillin (Hives)                                                          LABS:                        9.7    7.6   )-----------( 361      ( 29 Jun 2017 06:30 )             31.7     06-29    137  |  99  |  39.0<H>  ----------------------------<  177<H>  4.5   |  19.0<L>  |  1.49<H>    Ca    7.9<L>      29 Jun 2017 06:30    PT/INR - ( 29 Jun 2017 14:44 )   PT: 21.8 sec;   INR: 1.95 ratio      Cardiac Cath:  30 June 2017: 3VD, Official Report pending    TTE / RAMONITA:  < from: TTE Echo Complete w/Doppler (06.28.17 @ 11:25) >   Summary:   1. Technically good study.   2. Endocardial visualization was enhanced with intravenous echo contrast.   3. Severely decreased global left ventricular systolic function.   4. Left ventricular ejection fraction, by visual estimation, is <20%.   5. The left ventricular diastolic function could not be assessed in this   study.   6. Normal right ventricular size and function.   7. Severely enlarged left atrium.   8. Moderately dilated right atrium.   9. Thickening of the anterior and posterior mitral valveleaflets.  10. Mild mitral valve regurgitation.  11. Sclerotic aortic valve with normal opening.  12. Moderate tricuspid regurgitation.  13. Estimated pulmonary artery systolic pressure is 48.6 mmHg assuming a   right atrial pressure of 15 mmHg, which is consistent with mild pulmonary   hypertension.  14. There is no evidence of pericardial effusion.  15. Findings were D/W Dr. Carrizales and Dr. Muratori.    < end of copied text >    Review of Systems       See HPI    T(C): 36.4 (06-30-17 @ 10:50), Max: 36.9 (06-29-17 @ 23:47)  HR: 69 (06-30-17 @ 14:50) (65 - 100)  BP: 173/89 (06-30-17 @ 14:50) (156/87 - 184/99)  RR: 16 (06-30-17 @ 14:50) (8 - 20)  SpO2: 100% (06-30-17 @ 14:50) (96% - 100%)    Physical Exam  General: 83 y/o female, lying in bed s/p cardiac cath, NAD                                                         Neuro: Awake and alert                   Eyes: PERRL, EOMI .   Neck: supple, no JVD, trachea midline, no bruits  Chest: CTA, equal bilaterally, no wheezes/rales/rhonchi, normal excursion, no accessory muscle use note  CV: RRR, +S1S2  GI: soft, NT, ND, +BS  Extremities: SALINAS x 4, 3+ pitted edema to b/l lower extremities  SKIN: warm, dry, intact

## 2017-06-30 NOTE — PROGRESS NOTE ADULT - ASSESSMENT
*83 year old  nursing home resident female HTN  Afib  on coumadin a/d with elevated INR , troponin and n/v and possible duodenal ulcer . Echo revealed decreased LV dysfunction 20 %  Pt  treated and stabilized recommended [facility]     s/p cardiac cath revealed   3 vd   LAD 80%   LCX difuse   RCA 90 % multiple lesions  EDP 18

## 2017-06-30 NOTE — PROGRESS NOTE ADULT - SUBJECTIVE AND OBJECTIVE BOX
HPI:  84 years old female with PMH of A. Fib on Coumadin, CHF, DM, HTN, HLD, Colon Cancer s/p Resection, GERD and Dementia  sent from [facility] for evaluation of elevated Troponin. Pt was also having nausea  and vomiting and elevated INR pt was stabilized over  the last several days   was recommended to have a [facility]  for evaluation of elevated and suspicion for blockage       mertapenem  IVPB 500 milliGRAM(s) IV Intermittent every 24 hours  pantoprazole  Injectable 40 milliGRAM(s) IV Push two times a day  metoprolol Injectable 5 milliGRAM(s) IV Push every 6 hours  ondansetron Injectable 4 milliGRAM(s) IV Push every 4 hours PRN  acetylcysteine  Oral Solution 1200 milliGRAM(s) Oral every 12 hours  acetaminophen   Tablet. 650 milliGRAM(s) Oral every 6 hours PRN  atorvastatin 10 milliGRAM(s) Oral at bedtime  metoprolol 25 milliGRAM(s) Oral two times a day        REVIEW OF SYSTEMS:  Vital Signs Last 24 Hrs  T(C): 36.4 (30 Jun 2017 10:50), Max: 36.9 (29 Jun 2017 23:47)  T(F): 97.6 (30 Jun 2017 10:50), Max: 98.4 (29 Jun 2017 23:47)  HR: 74 (30 Jun 2017 12:15) (70 - 100)  BP: 174/80 (30 Jun 2017 12:15) (156/87 - 184/99)  BP(mean): --  RR: 8 (30 Jun 2017 12:15) (8 - 20)  SpO2: 99% (30 Jun 2017 12:15) (96% - 100%)    PHYSICAL EXAM:  General: A/ox 3, No acute Distress  Neck: Supple, NO JVD  Cardiac: S1 S2, No M/R/G  Pulmonary: CTAB, Breathing unlabored, No Rhonchi/Rales/Wheezing  Abdomen: Soft, Non -tender, +BS   Extremities: No Rashes, No edema  Neuro: A/o x 3, No focal deficits  Psch: normal mood , normal affect    LABS:  cret                        9.7    7.6   )-----------( 361      ( 29 Jun 2017 06:30 )             31.7     06-29    137  |  99  |  39.0<H>  ----------------------------<  177<H>  4.5   |  19.0<L>  |  1.49<H>    Ca    7.9<L>      29 Jun 2017 06:30      PT/INR - ( 29 Jun 2017 14:44 )   PT: 21.8 sec;   INR: 1.95 ratio           Daily     Daily   I&O's Detail    29 Jun 2017 07:01  -  30 Jun 2017 07:00  --------------------------------------------------------  IN:    sodium chloride 0.9%.: 1920 mL  Total IN: 1920 mL    OUT:  Total OUT: 0 mL    Total NET: 1920 mL HPI:  84 years old female with PMH of LUIS ANTONIO Fib on Coumadin, CHF, DM, HTN, HLD, Colon Cancer s/p Resection, GERD and Dementia  sent from [facility] for evaluation of elevated Troponin. Pt was also having nausea  and vomiting and elevated INR pt was stabilized over  the last several days . Echo revealed decreased Ef  of 20% was recommended to have a [facility]  for evaluation and suspicion for blockage       mertapenem  IVPB 500 milliGRAM(s) IV Intermittent every 24 hours  pantoprazole  Injectable 40 milliGRAM(s) IV Push two times a day  metoprolol Injectable 5 milliGRAM(s) IV Push every 6 hours  ondansetron Injectable 4 milliGRAM(s) IV Push every 4 hours PRN  acetylcysteine  Oral Solution 1200 milliGRAM(s) Oral every 12 hours  acetaminophen   Tablet. 650 milliGRAM(s) Oral every 6 hours PRN  atorvastatin 10 milliGRAM(s) Oral at bedtime  metoprolol 25 milliGRAM(s) Oral two times a day    Vital Signs Last 24 Hrs  T(C): 36.4 (30 Jun 2017 10:50), Max: 36.9 (29 Jun 2017 23:47)  T(F): 97.6 (30 Jun 2017 10:50), Max: 98.4 (29 Jun 2017 23:47)  HR: 74 (30 Jun 2017 12:15) (70 - 100)  BP: 174/80 (30 Jun 2017 12:15) (156/87 - 184/99)  BP(mean): --  RR: 8 (30 Jun 2017 12:15) (8 - 20)  SpO2: 99% (30 Jun 2017 12:15) (96% - 100%)          R groin sheath d/kavita by RN no evidence of bleeding   + PP Vss HPI:  84 years old female with PMH of A. Fib on Coumadin, CHF, DM, HTN, HLD, Colon Cancer s/p Resection, GERD and Dementia  sent from [facility] for evaluation of elevated Troponin. Pt was also having nausea  and vomiting and elevated INR pt was stabilized over  the last several days . Echo revealed decreased Ef  of 20% was recommended to have a [facility]  for evaluation and suspicion for blockage       mertapenem  IVPB 500 milliGRAM(s) IV Intermittent every 24 hours  pantoprazole  Injectable 40 milliGRAM(s) IV Push two times a day  metoprolol Injectable 5 milliGRAM(s) IV Push every 6 hours  ondansetron Injectable 4 milliGRAM(s) IV Push every 4 hours PRN  acetylcysteine  Oral Solution 1200 milliGRAM(s) Oral every 12 hours  acetaminophen   Tablet. 650 milliGRAM(s) Oral every 6 hours PRN  atorvastatin 10 milliGRAM(s) Oral at bedtime  metoprolol 25 milliGRAM(s) Oral two times a day    Vital Signs Last 24 Hrs  T(C): 36.4 (30 Jun 2017 10:50), Max: 36.9 (29 Jun 2017 23:47)  T(F): 97.6 (30 Jun 2017 10:50), Max: 98.4 (29 Jun 2017 23:47)  HR: 74 (30 Jun 2017 12:15) (70 - 100)  BP: 174/80 (30 Jun 2017 12:15) (156/87 - 184/99)  BP(mean): --  RR: 8 (30 Jun 2017 12:15) (8 - 20)  SpO2: 99% (30 Jun 2017 12:15) (96% - 100%)          R groin sheath d/kavita by RN no evidence of bleeding groin soft   non tender   + PP doppler  Vss

## 2017-07-01 LAB
ALBUMIN SERPL ELPH-MCNC: 3.1 G/DL — LOW (ref 3.3–5.2)
ALP SERPL-CCNC: 66 U/L — SIGNIFICANT CHANGE UP (ref 40–120)
ALT FLD-CCNC: 103 U/L — HIGH
ANION GAP SERPL CALC-SCNC: 17 MMOL/L — SIGNIFICANT CHANGE UP (ref 5–17)
ANION GAP SERPL CALC-SCNC: 17 MMOL/L — SIGNIFICANT CHANGE UP (ref 5–17)
AST SERPL-CCNC: 121 U/L — HIGH
BILIRUB SERPL-MCNC: 1.1 MG/DL — SIGNIFICANT CHANGE UP (ref 0.4–2)
BUN SERPL-MCNC: 43 MG/DL — HIGH (ref 8–20)
BUN SERPL-MCNC: 43 MG/DL — HIGH (ref 8–20)
CALCIUM SERPL-MCNC: 7.8 MG/DL — LOW (ref 8.6–10.2)
CALCIUM SERPL-MCNC: 7.8 MG/DL — LOW (ref 8.6–10.2)
CHLORIDE SERPL-SCNC: 98 MMOL/L — SIGNIFICANT CHANGE UP (ref 98–107)
CHLORIDE SERPL-SCNC: 98 MMOL/L — SIGNIFICANT CHANGE UP (ref 98–107)
CO2 SERPL-SCNC: 18 MMOL/L — LOW (ref 22–29)
CO2 SERPL-SCNC: 18 MMOL/L — LOW (ref 22–29)
CREAT SERPL-MCNC: 1.38 MG/DL — HIGH (ref 0.5–1.3)
CREAT SERPL-MCNC: 1.38 MG/DL — HIGH (ref 0.5–1.3)
GLUCOSE SERPL-MCNC: 247 MG/DL — HIGH (ref 70–115)
GLUCOSE SERPL-MCNC: 247 MG/DL — HIGH (ref 70–115)
HCT VFR BLD CALC: 32.7 % — LOW (ref 37–47)
HGB BLD-MCNC: 9.5 G/DL — LOW (ref 12–16)
INR BLD: 2.01 RATIO — HIGH (ref 0.88–1.16)
MCHC RBC-ENTMCNC: 22.2 PG — LOW (ref 27–31)
MCHC RBC-ENTMCNC: 29.1 G/DL — LOW (ref 32–36)
MCV RBC AUTO: 76.6 FL — LOW (ref 81–99)
PLATELET # BLD AUTO: 92 K/UL — LOW (ref 150–400)
POTASSIUM SERPL-MCNC: 4.4 MMOL/L — SIGNIFICANT CHANGE UP (ref 3.5–5.3)
POTASSIUM SERPL-MCNC: 4.4 MMOL/L — SIGNIFICANT CHANGE UP (ref 3.5–5.3)
POTASSIUM SERPL-SCNC: 4.4 MMOL/L — SIGNIFICANT CHANGE UP (ref 3.5–5.3)
POTASSIUM SERPL-SCNC: 4.4 MMOL/L — SIGNIFICANT CHANGE UP (ref 3.5–5.3)
PROT SERPL-MCNC: 5.8 G/DL — LOW (ref 6.6–8.7)
PROTHROM AB SERPL-ACNC: 22.4 SEC — HIGH (ref 9.8–12.7)
RBC # BLD: 4.27 M/UL — LOW (ref 4.4–5.2)
RBC # FLD: 17.9 % — HIGH (ref 11–15.6)
SODIUM SERPL-SCNC: 133 MMOL/L — LOW (ref 135–145)
SODIUM SERPL-SCNC: 133 MMOL/L — LOW (ref 135–145)
WBC # BLD: 7 K/UL — SIGNIFICANT CHANGE UP (ref 4.8–10.8)
WBC # FLD AUTO: 7 K/UL — SIGNIFICANT CHANGE UP (ref 4.8–10.8)

## 2017-07-01 PROCEDURE — 99233 SBSQ HOSP IP/OBS HIGH 50: CPT

## 2017-07-01 RX ORDER — PANTOPRAZOLE SODIUM 20 MG/1
40 TABLET, DELAYED RELEASE ORAL
Qty: 0 | Refills: 0 | Status: DISCONTINUED | OUTPATIENT
Start: 2017-07-01 | End: 2017-07-06

## 2017-07-01 RX ORDER — CARVEDILOL PHOSPHATE 80 MG/1
3.12 CAPSULE, EXTENDED RELEASE ORAL EVERY 12 HOURS
Qty: 0 | Refills: 0 | Status: DISCONTINUED | OUTPATIENT
Start: 2017-07-01 | End: 2017-07-06

## 2017-07-01 RX ORDER — LISINOPRIL 2.5 MG/1
5 TABLET ORAL DAILY
Qty: 0 | Refills: 0 | Status: DISCONTINUED | OUTPATIENT
Start: 2017-07-01 | End: 2017-07-06

## 2017-07-01 RX ADMIN — Medication 4: at 17:21

## 2017-07-01 RX ADMIN — LISINOPRIL 5 MILLIGRAM(S): 2.5 TABLET ORAL at 17:21

## 2017-07-01 RX ADMIN — MORPHINE SULFATE 4 MILLIGRAM(S): 50 CAPSULE, EXTENDED RELEASE ORAL at 15:45

## 2017-07-01 RX ADMIN — MORPHINE SULFATE 4 MILLIGRAM(S): 50 CAPSULE, EXTENDED RELEASE ORAL at 10:00

## 2017-07-01 RX ADMIN — MORPHINE SULFATE 2 MILLIGRAM(S): 50 CAPSULE, EXTENDED RELEASE ORAL at 23:10

## 2017-07-01 RX ADMIN — MORPHINE SULFATE 4 MILLIGRAM(S): 50 CAPSULE, EXTENDED RELEASE ORAL at 10:21

## 2017-07-01 RX ADMIN — ONDANSETRON 4 MILLIGRAM(S): 8 TABLET, FILM COATED ORAL at 12:39

## 2017-07-01 RX ADMIN — ONDANSETRON 4 MILLIGRAM(S): 8 TABLET, FILM COATED ORAL at 21:33

## 2017-07-01 RX ADMIN — Medication 25 MILLIGRAM(S): at 05:10

## 2017-07-01 RX ADMIN — MORPHINE SULFATE 4 MILLIGRAM(S): 50 CAPSULE, EXTENDED RELEASE ORAL at 00:16

## 2017-07-01 RX ADMIN — CARVEDILOL PHOSPHATE 3.12 MILLIGRAM(S): 80 CAPSULE, EXTENDED RELEASE ORAL at 17:21

## 2017-07-01 RX ADMIN — Medication 81 MILLIGRAM(S): at 12:35

## 2017-07-01 RX ADMIN — MORPHINE SULFATE 2 MILLIGRAM(S): 50 CAPSULE, EXTENDED RELEASE ORAL at 21:33

## 2017-07-01 RX ADMIN — PANTOPRAZOLE SODIUM 40 MILLIGRAM(S): 20 TABLET, DELAYED RELEASE ORAL at 17:21

## 2017-07-01 RX ADMIN — PANTOPRAZOLE SODIUM 40 MILLIGRAM(S): 20 TABLET, DELAYED RELEASE ORAL at 06:00

## 2017-07-01 RX ADMIN — MORPHINE SULFATE 4 MILLIGRAM(S): 50 CAPSULE, EXTENDED RELEASE ORAL at 16:10

## 2017-07-01 RX ADMIN — ONDANSETRON 4 MILLIGRAM(S): 8 TABLET, FILM COATED ORAL at 00:16

## 2017-07-01 RX ADMIN — Medication 2: at 08:33

## 2017-07-01 NOTE — PROGRESS NOTE ADULT - SUBJECTIVE AND OBJECTIVE BOX
INTERVAL HPI/OVERNIGHT EVENTS: Patient was seen and examined bedside. No acute events overnight. Patient is tolerating her clear liquid diet. Pain is adequately controlled with medication        MEDICATIONS  (STANDING):  insulin lispro (HumaLOG) corrective regimen sliding scale   SubCutaneous every 6 hours  dextrose 5%. 1000 milliLiter(s) (50 mL/Hr) IV Continuous <Continuous>  dextrose 50% Injectable 12.5 Gram(s) IV Push once  dextrose 50% Injectable 25 Gram(s) IV Push once  dextrose 50% Injectable 25 Gram(s) IV Push once  atorvastatin 10 milliGRAM(s) Oral at bedtime  metoprolol 25 milliGRAM(s) Oral two times a day  aspirin  chewable 81 milliGRAM(s) Oral daily  pantoprazole    Tablet 40 milliGRAM(s) Oral two times a day before meals    MEDICATIONS  (PRN):  morphine  - Injectable 2 milliGRAM(s) IV Push every 4 hours PRN Moderate Pain (4 - 6)  morphine  - Injectable 4 milliGRAM(s) IV Push every 4 hours PRN Severe Pain (7 - 10)  dextrose Gel 1 Dose(s) Oral once PRN Blood Glucose LESS THAN 70 milliGRAM(s)/deciLiter  glucagon  Injectable 1 milliGRAM(s) IntraMuscular once PRN Glucose <70 milliGRAM(s)/deciLiter  ondansetron Injectable 4 milliGRAM(s) IV Push every 4 hours PRN Nausea and/or Vomiting  acetaminophen   Tablet. 650 milliGRAM(s) Oral every 6 hours PRN Moderate Pain (4 - 6)      Vital Signs Last 24 Hrs  T(C): 36.6 (01 Jul 2017 05:07), Max: 36.6 (01 Jul 2017 05:07)  T(F): 97.8 (01 Jul 2017 05:07), Max: 97.8 (01 Jul 2017 05:07)  HR: 70 (01 Jul 2017 05:07) (57 - 79)  BP: 155/72 (01 Jul 2017 05:07) (136/64 - 183/84)  BP(mean): --  RR: 16 (01 Jul 2017 05:07) (8 - 18)  SpO2: 100% (01 Jul 2017 05:07) (97% - 100%)    Physical Exam:    Neurological:  No sensory/motor deficits    HEENT: PERRLA, EOMI, no drainage or redness    Neck: No bruits; no thyromegaly or nodules,  No JVD    Back: Normal spine flexure, No CVA tenderness, No deformity or limitation of movement    Respiratory: Breath Sounds equal & clear to auscultation, no accessory muscle use    Cardiovascular: Regular rate & rhythm, normal S1, S2; no murmurs, gallops or rubs    Gastrointestinal: Soft, non-tender, normal bowel sounds    Extremities: No peripheral edema, No cyanosis, clubbing     Vascular: Equal and normal pulses: 2+ peripheral pulses throughout    Musculoskeletal: No joint pain, swelling or deformity; no limitation of movement    Skin: No rashes      I&O's Detail      LABS:    07-01    133<L>  |  98  |  43.0<H>  ----------------------------<  247<H>  4.4   |  18.0<L>  |  1.38<H>    Ca    7.8<L>      01 Jul 2017 09:50    TPro  5.8<L>  /  Alb  3.1<L>  /  TBili  1.1  /  DBili  x   /  AST  121<H>  /  ALT  103<H>  /  AlkPhos  66  07-01    PT/INR - ( 29 Jun 2017 14:44 )   PT: 21.8 sec;   INR: 1.95 ratio               RADIOLOGY & ADDITIONAL STUDIES:

## 2017-07-01 NOTE — PROGRESS NOTE ADULT - ATTENDING COMMENTS
The patient was seen and examined  She needs a contrast study of her duodenum--either duodenogram or CT with oral contrast

## 2017-07-01 NOTE — PROGRESS NOTE ADULT - SUBJECTIVE AND OBJECTIVE BOX
84y Female who had a C see full report. Patient awake and alert without complaints overnight.      Right Groin: Soft, no bleeding, no hematoma  Extremity: + distal pulses      A/P: 84y Female s/p LHC no intervention  1. Groin management discussed with patient  2. Continue current meds  3. Plan as per medicine

## 2017-07-01 NOTE — PROGRESS NOTE ADULT - SUBJECTIVE AND OBJECTIVE BOX
CHIEF COMPLAINT:Patient is a 84y old  Female who presents with a chief complaint of Elevated Troponin I   Pt with n/v. Seen by GI and surgery.   TVD, poor candidate for CABG due to multiple risk factors and dementia  Less n/v. Tolerating clears     Allergies:  penicillin (Hives)    MEDICATIONS:  metoprolol 25 milliGRAM(s) Oral two times a day  morphine  - Injectable 2 milliGRAM(s) IV Push every 4 hours PRN  morphine  - Injectable 4 milliGRAM(s) IV Push every 4 hours PRN  ondansetron Injectable 4 milliGRAM(s) IV Push every 4 hours PRN  acetaminophen   Tablet. 650 milliGRAM(s) Oral every 6 hours PRN  pantoprazole    Tablet 40 milliGRAM(s) Oral two times a day before meals  insulin lispro (HumaLOG) corrective regimen sliding scale   SubCutaneous every 6 hours  dextrose Gel 1 Dose(s) Oral once PRN  dextrose 50% Injectable 12.5 Gram(s) IV Push once  dextrose 50% Injectable 25 Gram(s) IV Push once  dextrose 50% Injectable 25 Gram(s) IV Push once  glucagon  Injectable 1 milliGRAM(s) IntraMuscular once PRN  atorvastatin 10 milliGRAM(s) Oral at bedtime  dextrose 5%. 1000 milliLiter(s) IV Continuous <Continuous>  aspirin  chewable 81 milliGRAM(s) Oral daily    PHYSICAL EXAM:  T(C): 36.6 (17 @ 05:07), Max: 36.6 (17 @ 05:07)  HR: 76 (17 @ 12:23) (57 - 79)  BP: 142/84 (17 @ 12:23) (136/64 - 175/77)  RR: 18 (17 @ 12:23) (8 - 18)  SpO2: 98% (17 @ 12:23) (98% - 100%)     Daily Weight in k.6 (2017 04:22)    Appearance: Normal	  HEENT:   NC/AT  Eye: Pink Conjunctiva  Lungs: CTA B/L  CVS: IRRR, Normal S1 and S2,  Pulses: Normal distal pulses.        133<L>  |  98  |  43.0<H>  ----------------------------<  247<H>  4.4   |  18.0<L>  |  1.38<H>    Ca    7.8<L>      2017 09:50    TPro  5.8<L>  /  Alb  3.1<L>  /  TBili  1.1  /  DBili  x   /  AST  121<H>  /  ALT  103<H>  /  AlkPhos  66  -  ASSESSMENT/PLAN:

## 2017-07-01 NOTE — PROGRESS NOTE ADULT - SUBJECTIVE AND OBJECTIVE BOX
SUBJECTIVE    REVIEW OF SYSTEMS    General: still a little nauseous, but better	    Skin/Breast: No rash  	  ENMT: No visual problems, no sore throat	    Respiratory and Thorax: No cough, No CP, No SOB  	  Cardiovascular: No CP, No palpitations    Gastrointestinal: No Abd pain, No N/V/D    Musculoskeletal: +right hip pain, No back pain	    Neurological: No headache    Psychiatric: No anxiety      OBJECTIVE    Vital Signs Last 24 Hrs  T(C): 36.6 (07-01-17 @ 05:07), Max: 36.6 (07-01-17 @ 05:07)  T(F): 97.8 (07-01-17 @ 05:07), Max: 97.8 (07-01-17 @ 05:07)  HR: 80 (07-01-17 @ 17:18) (60 - 80)  BP: 144/82 (07-01-17 @ 17:18) (136/64 - 175/77)  BP(mean): --  RR: 18 (07-01-17 @ 17:18) (9 - 18)  SpO2: 97% (07-01-17 @ 17:18) (97% - 100%)    PHYSICAL EXAM:    Constitutional: Not in any distress    Eyes: No conjunctival injection    ENMT: No oral lesions    Neck: No nodes, no adenopathy    Back: Straight, no defects    Respiratory: clear b/l    Cardiovascular: RRR, no murmur    Gastrointestinal: soft, NT, ND    Extremities: No edema, no erythema    Neurological: no focal deficit    Skin: No rash      MEDICATIONS  (STANDING):  insulin lispro (HumaLOG) corrective regimen sliding scale   SubCutaneous every 6 hours  dextrose 5%. 1000 milliLiter(s) (50 mL/Hr) IV Continuous <Continuous>  dextrose 50% Injectable 12.5 Gram(s) IV Push once  dextrose 50% Injectable 25 Gram(s) IV Push once  dextrose 50% Injectable 25 Gram(s) IV Push once  aspirin  chewable 81 milliGRAM(s) Oral daily  pantoprazole    Tablet 40 milliGRAM(s) Oral two times a day before meals  carvedilol 3.125 milliGRAM(s) Oral every 12 hours  lisinopril 5 milliGRAM(s) Oral daily    MEDICATIONS  (PRN):  morphine  - Injectable 2 milliGRAM(s) IV Push every 4 hours PRN Moderate Pain (4 - 6)  morphine  - Injectable 4 milliGRAM(s) IV Push every 4 hours PRN Severe Pain (7 - 10)  dextrose Gel 1 Dose(s) Oral once PRN Blood Glucose LESS THAN 70 milliGRAM(s)/deciLiter  glucagon  Injectable 1 milliGRAM(s) IntraMuscular once PRN Glucose <70 milliGRAM(s)/deciLiter  ondansetron Injectable 4 milliGRAM(s) IV Push every 4 hours PRN Nausea and/or Vomiting  acetaminophen   Tablet. 650 milliGRAM(s) Oral every 6 hours PRN Moderate Pain (4 - 6)                              9.5    7.0   )-----------( 92       ( 01 Jul 2017 09:50 )             32.7     01 Jul 2017 09:50    133    |  98     |  43.0   ----------------------------<  247    4.4     |  18.0   |  1.38     Ca    7.8        01 Jul 2017 09:50    TPro  5.8    /  Alb  3.1    /  TBili  1.1    /  DBili  x      /  AST  121    /  ALT  103    /  AlkPhos  66     01 Jul 2017 09:50    Allergies    penicillin (Hives)    Intolerances

## 2017-07-01 NOTE — PROGRESS NOTE ADULT - ASSESSMENT
60F with possible duodenal perforation presented with a 2 week history of nausea and vomiting, epigastric pain, and decreased appetite.    1. possible duodenal perforation  - serial abdominal examinations  -follow up CT abdomen/pelvis with PO contrast    2. afib 60F with possible duodenal perforation presented with a 2 week history of nausea and vomiting, epigastric pain, and decreased appetite.    1. possible duodenal perforation  - serial abdominal examinations  -follow up CT abdomen/pelvis with PO contrast  2. afib  -monitor PT/INR    3. DM  -accuchecks    4. HTN  -controlled, continue metoprolol    5. HLD  -continue statin    6. diet  -continue CLD to await bowel function

## 2017-07-02 LAB
CULTURE RESULTS: SIGNIFICANT CHANGE UP
CULTURE RESULTS: SIGNIFICANT CHANGE UP
SPECIMEN SOURCE: SIGNIFICANT CHANGE UP
SPECIMEN SOURCE: SIGNIFICANT CHANGE UP

## 2017-07-02 PROCEDURE — 99233 SBSQ HOSP IP/OBS HIGH 50: CPT

## 2017-07-02 RX ORDER — INSULIN LISPRO 100/ML
VIAL (ML) SUBCUTANEOUS AT BEDTIME
Qty: 0 | Refills: 0 | Status: DISCONTINUED | OUTPATIENT
Start: 2017-07-02 | End: 2017-07-06

## 2017-07-02 RX ORDER — INSULIN LISPRO 100/ML
VIAL (ML) SUBCUTANEOUS
Qty: 0 | Refills: 0 | Status: DISCONTINUED | OUTPATIENT
Start: 2017-07-02 | End: 2017-07-06

## 2017-07-02 RX ORDER — DEXTROSE 50 % IN WATER 50 %
1 SYRINGE (ML) INTRAVENOUS ONCE
Qty: 0 | Refills: 0 | Status: DISCONTINUED | OUTPATIENT
Start: 2017-07-02 | End: 2017-07-06

## 2017-07-02 RX ORDER — DEXTROSE 50 % IN WATER 50 %
25 SYRINGE (ML) INTRAVENOUS ONCE
Qty: 0 | Refills: 0 | Status: DISCONTINUED | OUTPATIENT
Start: 2017-07-02 | End: 2017-07-06

## 2017-07-02 RX ORDER — SODIUM CHLORIDE 9 MG/ML
1000 INJECTION, SOLUTION INTRAVENOUS
Qty: 0 | Refills: 0 | Status: DISCONTINUED | OUTPATIENT
Start: 2017-07-02 | End: 2017-07-06

## 2017-07-02 RX ORDER — GLUCAGON INJECTION, SOLUTION 0.5 MG/.1ML
1 INJECTION, SOLUTION SUBCUTANEOUS ONCE
Qty: 0 | Refills: 0 | Status: DISCONTINUED | OUTPATIENT
Start: 2017-07-02 | End: 2017-07-06

## 2017-07-02 RX ORDER — INSULIN LISPRO 100/ML
VIAL (ML) SUBCUTANEOUS
Qty: 0 | Refills: 0 | Status: DISCONTINUED | OUTPATIENT
Start: 2017-07-02 | End: 2017-07-02

## 2017-07-02 RX ORDER — DEXTROSE 50 % IN WATER 50 %
12.5 SYRINGE (ML) INTRAVENOUS ONCE
Qty: 0 | Refills: 0 | Status: DISCONTINUED | OUTPATIENT
Start: 2017-07-02 | End: 2017-07-06

## 2017-07-02 RX ADMIN — MORPHINE SULFATE 2 MILLIGRAM(S): 50 CAPSULE, EXTENDED RELEASE ORAL at 09:44

## 2017-07-02 RX ADMIN — CARVEDILOL PHOSPHATE 3.12 MILLIGRAM(S): 80 CAPSULE, EXTENDED RELEASE ORAL at 06:24

## 2017-07-02 RX ADMIN — PANTOPRAZOLE SODIUM 40 MILLIGRAM(S): 20 TABLET, DELAYED RELEASE ORAL at 06:24

## 2017-07-02 RX ADMIN — CARVEDILOL PHOSPHATE 3.12 MILLIGRAM(S): 80 CAPSULE, EXTENDED RELEASE ORAL at 17:17

## 2017-07-02 RX ADMIN — LISINOPRIL 5 MILLIGRAM(S): 2.5 TABLET ORAL at 06:24

## 2017-07-02 RX ADMIN — Medication 1: at 23:23

## 2017-07-02 RX ADMIN — MORPHINE SULFATE 2 MILLIGRAM(S): 50 CAPSULE, EXTENDED RELEASE ORAL at 08:44

## 2017-07-02 RX ADMIN — Medication 3: at 07:44

## 2017-07-02 RX ADMIN — MORPHINE SULFATE 2 MILLIGRAM(S): 50 CAPSULE, EXTENDED RELEASE ORAL at 15:27

## 2017-07-02 RX ADMIN — Medication 650 MILLIGRAM(S): at 01:33

## 2017-07-02 RX ADMIN — MORPHINE SULFATE 4 MILLIGRAM(S): 50 CAPSULE, EXTENDED RELEASE ORAL at 02:45

## 2017-07-02 RX ADMIN — Medication 650 MILLIGRAM(S): at 00:34

## 2017-07-02 RX ADMIN — Medication 4: at 00:33

## 2017-07-02 RX ADMIN — Medication 81 MILLIGRAM(S): at 11:14

## 2017-07-02 RX ADMIN — MORPHINE SULFATE 2 MILLIGRAM(S): 50 CAPSULE, EXTENDED RELEASE ORAL at 14:59

## 2017-07-02 RX ADMIN — Medication 650 MILLIGRAM(S): at 23:21

## 2017-07-02 RX ADMIN — Medication 2: at 12:12

## 2017-07-02 RX ADMIN — Medication 2: at 17:17

## 2017-07-02 RX ADMIN — MORPHINE SULFATE 4 MILLIGRAM(S): 50 CAPSULE, EXTENDED RELEASE ORAL at 01:30

## 2017-07-02 RX ADMIN — PANTOPRAZOLE SODIUM 40 MILLIGRAM(S): 20 TABLET, DELAYED RELEASE ORAL at 17:17

## 2017-07-02 NOTE — PROGRESS NOTE ADULT - SUBJECTIVE AND OBJECTIVE BOX
SUBJECTIVE: Patient seen and examined. No acute events overnight. Pt scheduled for abd CT with PO contrast yesterday; pt unable to tolerating PO contrast ingestion, CT deferred at this time. Pt states her pain is significantly improved. Continues to tolerate diet, positive BM, positive stool. No other complaints at this time, pt denies chest pain, shortness of breath, fever, chills, nausea or emesis.      MEDICATIONS  (STANDING):  dextrose 5%. 1000 milliLiter(s) (50 mL/Hr) IV Continuous <Continuous>  dextrose 50% Injectable 12.5 Gram(s) IV Push once  dextrose 50% Injectable 25 Gram(s) IV Push once  dextrose 50% Injectable 25 Gram(s) IV Push once  aspirin  chewable 81 milliGRAM(s) Oral daily  pantoprazole    Tablet 40 milliGRAM(s) Oral two times a day before meals  carvedilol 3.125 milliGRAM(s) Oral every 12 hours  lisinopril 5 milliGRAM(s) Oral daily  insulin lispro (HumaLOG) corrective regimen sliding scale   SubCutaneous three times a day before meals    MEDICATIONS  (PRN):  morphine  - Injectable 2 milliGRAM(s) IV Push every 4 hours PRN Moderate Pain (4 - 6)  morphine  - Injectable 4 milliGRAM(s) IV Push every 4 hours PRN Severe Pain (7 - 10)  dextrose Gel 1 Dose(s) Oral once PRN Blood Glucose LESS THAN 70 milliGRAM(s)/deciLiter  glucagon  Injectable 1 milliGRAM(s) IntraMuscular once PRN Glucose <70 milliGRAM(s)/deciLiter  ondansetron Injectable 4 milliGRAM(s) IV Push every 4 hours PRN Nausea and/or Vomiting  acetaminophen   Tablet. 650 milliGRAM(s) Oral every 6 hours PRN Moderate Pain (4 - 6)      Vital Signs Last 24 Hrs  T(C): 36.6 (02 Jul 2017 07:27), Max: 36.8 (02 Jul 2017 06:00)  T(F): 97.8 (02 Jul 2017 07:27), Max: 98.3 (02 Jul 2017 06:00)  HR: 84 (02 Jul 2017 08:12) (76 - 84)  BP: 120/80 (02 Jul 2017 08:12) (110/50 - 144/82)  BP(mean): --  RR: 16 (02 Jul 2017 08:12) (16 - 18)  SpO2: 97% (02 Jul 2017 08:12) (96% - 98%)    PE  Gen: NAD, AAOx3  Pulm: CTAB  CV: RRR  Abd: soft, minimal diffuse tenderness, no rebound  Ext: moving all extremities  Vasc: 2+ peripheral pulses  Neuro: GCS 15, nonfocal      I&O's Detail    02 Jul 2017 07:01  -  02 Jul 2017 10:37  --------------------------------------------------------  IN:    Oral Fluid: 240 mL  Total IN: 240 mL    OUT:  Total OUT: 0 mL    Total NET: 240 mL          LABS:                        9.5    7.0   )-----------( 92       ( 01 Jul 2017 09:50 )             32.7     07-01    133<L>  |  98  |  43.0<H>  ----------------------------<  247<H>  4.4   |  18.0<L>  |  1.38<H>    Ca    7.8<L>      01 Jul 2017 09:50    TPro  5.8<L>  /  Alb  3.1<L>  /  TBili  1.1  /  DBili  x   /  AST  121<H>  /  ALT  103<H>  /  AlkPhos  66  07-01    PT/INR - ( 01 Jul 2017 09:50 )   PT: 22.4 sec;   INR: 2.01 ratio

## 2017-07-02 NOTE — PROGRESS NOTE ADULT - ASSESSMENT
1. Duodenal perforation, with recurrent nausea,  No intervention as per surgery  2. HFrEf, medical mgmt  3. TVD, family will meet today, I do not recommend PCI in this pt with multiple risk factors. PCI would be high risk in this pt.  agrees. Pt's daughter is coming in this pm.   Dr Carrizales will follow from tomorrow

## 2017-07-02 NOTE — PROGRESS NOTE ADULT - SUBJECTIVE AND OBJECTIVE BOX
CHIEF COMPLAINT:Patient is a 84y old  Female who presents with a chief complaint of Elevated Troponin I (26 Jun 2017 23:46)  Pt with duodenal perforation, unable to repeat ct scan    Allergies:  penicillin (Hives)  	  MEDICATIONS:  carvedilol 3.125 milliGRAM(s) Oral every 12 hours  lisinopril 5 milliGRAM(s) Oral daily  morphine  - Injectable 2 milliGRAM(s) IV Push every 4 hours PRN  morphine  - Injectable 4 milliGRAM(s) IV Push every 4 hours PRN  ondansetron Injectable 4 milliGRAM(s) IV Push every 4 hours PRN  acetaminophen   Tablet. 650 milliGRAM(s) Oral every 6 hours PRN  pantoprazole    Tablet 40 milliGRAM(s) Oral two times a day before meals  dextrose Gel 1 Dose(s) Oral once PRN  dextrose 50% Injectable 12.5 Gram(s) IV Push once  dextrose 50% Injectable 25 Gram(s) IV Push once  dextrose 50% Injectable 25 Gram(s) IV Push once  glucagon  Injectable 1 milliGRAM(s) IntraMuscular once PRN  insulin lispro (HumaLOG) corrective regimen sliding scale   SubCutaneous three times a day before meals  dextrose 5%. 1000 milliLiter(s) IV Continuous <Continuous>  aspirin  chewable 81 milliGRAM(s) Oral daily    PHYSICAL EXAM:  T(C): 36.6 (07-02-17 @ 07:27), Max: 36.8 (07-02-17 @ 06:00)  HR: 84 (07-02-17 @ 08:12) (76 - 84)  BP: 120/80 (07-02-17 @ 08:12) (110/50 - 144/82)  RR: 16 (07-02-17 @ 08:12) (16 - 18)  SpO2: 97% (07-02-17 @ 08:12) (96% - 98%)  I&O's Summary    02 Jul 2017 07:01  -  02 Jul 2017 11:52  --------------------------------------------------------  IN: 240 mL / OUT: 0 mL / NET: 240 mL  Appearance: Normal	  HEENT:   NC/AT  Eye: Pink Conjunctiva  Lungs: CTA B/L  CVS: RRR, Normal S1 and S2, No Edema  Pulses: Normal distal pulses.  Neuro: A&O x3    LABS:	 	                         9.5    7.0   )-----------( 92       ( 01 Jul 2017 09:50 )             32.7     07-01    133<L>  |  98  |  43.0<H>  ----------------------------<  247<H>  4.4   |  18.0<L>  |  1.38<H>    Ca    7.8<L>      01 Jul 2017 09:50    TPro  5.8<L>  /  Alb  3.1<L>  /  TBili  1.1  /  DBili  x   /  AST  121<H>  /  ALT  103<H>  /  AlkPhos  66  07-01    ASSESSMENT/PLAN:

## 2017-07-02 NOTE — PROGRESS NOTE ADULT - SUBJECTIVE AND OBJECTIVE BOX
SUBJECTIVE    REVIEW OF SYSTEMS    General: Not in any pain	    Skin/Breast: No rash  	  ENMT: No visual problems, no sore throat	    Respiratory and Thorax: No cough, No CP, No SOB  	  Cardiovascular: No CP, No palpitations    Gastrointestinal: No Abd pain, No N/V/D    Musculoskeletal: No Joint pain, No back pain	    Neurological: No headache    Psychiatric: No anxiety      OBJECTIVE    Vital Signs Last 24 Hrs  T(C): 36.3 (07-02-17 @ 15:33), Max: 36.8 (07-02-17 @ 06:00)  T(F): 97.4 (07-02-17 @ 15:33), Max: 98.3 (07-02-17 @ 06:00)  HR: 88 (07-02-17 @ 16:59) (80 - 88)  BP: 138/70 (07-02-17 @ 16:59) (110/50 - 138/70)  BP(mean): --  RR: 14 (07-02-17 @ 16:59) (14 - 16)  SpO2: 96% (07-02-17 @ 16:59) (95% - 97%)    PHYSICAL EXAM:    Constitutional: Not in any distress    Eyes: No conjunctival injection    ENMT: No oral lesions    Neck: No nodes, no adenopathy    Back: Straight, no defects    Respiratory: clear b/l    Cardiovascular: RRR, no murmur    Gastrointestinal: soft, NT, ND    Extremities: No edema, no erythema    Neurological: no focal deficit    Skin: No rash      MEDICATIONS  (STANDING):  dextrose 5%. 1000 milliLiter(s) (50 mL/Hr) IV Continuous <Continuous>  dextrose 50% Injectable 12.5 Gram(s) IV Push once  dextrose 50% Injectable 25 Gram(s) IV Push once  dextrose 50% Injectable 25 Gram(s) IV Push once  aspirin  chewable 81 milliGRAM(s) Oral daily  pantoprazole    Tablet 40 milliGRAM(s) Oral two times a day before meals  carvedilol 3.125 milliGRAM(s) Oral every 12 hours  lisinopril 5 milliGRAM(s) Oral daily  insulin lispro (HumaLOG) corrective regimen sliding scale   SubCutaneous three times a day before meals    MEDICATIONS  (PRN):  morphine  - Injectable 2 milliGRAM(s) IV Push every 4 hours PRN Moderate Pain (4 - 6)  morphine  - Injectable 4 milliGRAM(s) IV Push every 4 hours PRN Severe Pain (7 - 10)  dextrose Gel 1 Dose(s) Oral once PRN Blood Glucose LESS THAN 70 milliGRAM(s)/deciLiter  glucagon  Injectable 1 milliGRAM(s) IntraMuscular once PRN Glucose <70 milliGRAM(s)/deciLiter  ondansetron Injectable 4 milliGRAM(s) IV Push every 4 hours PRN Nausea and/or Vomiting  acetaminophen   Tablet. 650 milliGRAM(s) Oral every 6 hours PRN Moderate Pain (4 - 6)                              9.5    7.0   )-----------( 92       ( 01 Jul 2017 09:50 )             32.7     01 Jul 2017 09:50    133    |  98     |  43.0   ----------------------------<  247    4.4     |  18.0   |  1.38     Ca    7.8        01 Jul 2017 09:50    TPro  5.8    /  Alb  3.1    /  TBili  1.1    /  DBili  x      /  AST  121    /  ALT  103    /  AlkPhos  66     01 Jul 2017 09:50    Allergies    penicillin (Hives)    Intolerances

## 2017-07-02 NOTE — PROGRESS NOTE ADULT - ASSESSMENT
84 year old female with likely duodenal perforation, clinically doing well  -no acute surgical intervention indicated at this time  -please reconsult as necessary  -please call [facility] with questions or concerns

## 2017-07-03 DIAGNOSIS — M25.551 PAIN IN RIGHT HIP: ICD-10-CM

## 2017-07-03 DIAGNOSIS — G89.4 CHRONIC PAIN SYNDROME: ICD-10-CM

## 2017-07-03 DIAGNOSIS — K59.09 OTHER CONSTIPATION: ICD-10-CM

## 2017-07-03 DIAGNOSIS — I25.799 ATHEROSCLEROSIS OF OTHER CORONARY ARTERY BYPASS GRAFT(S) WITH UNSPECIFIED ANGINA PECTORIS: ICD-10-CM

## 2017-07-03 LAB
ALBUMIN SERPL ELPH-MCNC: 2.6 G/DL — LOW (ref 3.3–5.2)
ALP SERPL-CCNC: 70 U/L — SIGNIFICANT CHANGE UP (ref 40–120)
ALT FLD-CCNC: 51 U/L — HIGH
ANION GAP SERPL CALC-SCNC: 14 MMOL/L — SIGNIFICANT CHANGE UP (ref 5–17)
AST SERPL-CCNC: 35 U/L — HIGH
BILIRUB SERPL-MCNC: 1 MG/DL — SIGNIFICANT CHANGE UP (ref 0.4–2)
BUN SERPL-MCNC: 37 MG/DL — HIGH (ref 8–20)
CALCIUM SERPL-MCNC: 7.5 MG/DL — LOW (ref 8.6–10.2)
CHLORIDE SERPL-SCNC: 95 MMOL/L — LOW (ref 98–107)
CO2 SERPL-SCNC: 21 MMOL/L — LOW (ref 22–29)
CREAT SERPL-MCNC: 1.46 MG/DL — HIGH (ref 0.5–1.3)
GLUCOSE SERPL-MCNC: 298 MG/DL — HIGH (ref 70–115)
HBA1C BLD-MCNC: 9.4 % — HIGH (ref 4–5.6)
HCT VFR BLD CALC: 30.4 % — LOW (ref 37–47)
HGB BLD-MCNC: 9.2 G/DL — LOW (ref 12–16)
INR BLD: 1.63 RATIO — HIGH (ref 0.88–1.16)
MCHC RBC-ENTMCNC: 22.5 PG — LOW (ref 27–31)
MCHC RBC-ENTMCNC: 30.3 G/DL — LOW (ref 32–36)
MCV RBC AUTO: 74.3 FL — LOW (ref 81–99)
PLATELET # BLD AUTO: 234 K/UL — SIGNIFICANT CHANGE UP (ref 150–400)
POTASSIUM SERPL-MCNC: 4 MMOL/L — SIGNIFICANT CHANGE UP (ref 3.5–5.3)
POTASSIUM SERPL-SCNC: 4 MMOL/L — SIGNIFICANT CHANGE UP (ref 3.5–5.3)
PROT SERPL-MCNC: 5.5 G/DL — LOW (ref 6.6–8.7)
PROTHROM AB SERPL-ACNC: 18.1 SEC — HIGH (ref 9.8–12.7)
RBC # BLD: 4.09 M/UL — LOW (ref 4.4–5.2)
RBC # FLD: 17.5 % — HIGH (ref 11–15.6)
SODIUM SERPL-SCNC: 130 MMOL/L — LOW (ref 135–145)
WBC # BLD: 6.9 K/UL — SIGNIFICANT CHANGE UP (ref 4.8–10.8)
WBC # FLD AUTO: 6.9 K/UL — SIGNIFICANT CHANGE UP (ref 4.8–10.8)

## 2017-07-03 PROCEDURE — 99223 1ST HOSP IP/OBS HIGH 75: CPT

## 2017-07-03 RX ORDER — LIDOCAINE 4 G/100G
2 CREAM TOPICAL DAILY
Qty: 0 | Refills: 0 | Status: DISCONTINUED | OUTPATIENT
Start: 2017-07-03 | End: 2017-07-06

## 2017-07-03 RX ORDER — ACETAMINOPHEN 500 MG
650 TABLET ORAL EVERY 8 HOURS
Qty: 0 | Refills: 0 | Status: DISCONTINUED | OUTPATIENT
Start: 2017-07-03 | End: 2017-07-03

## 2017-07-03 RX ORDER — HYDROMORPHONE HYDROCHLORIDE 2 MG/ML
4 INJECTION INTRAMUSCULAR; INTRAVENOUS; SUBCUTANEOUS EVERY 4 HOURS
Qty: 0 | Refills: 0 | Status: DISCONTINUED | OUTPATIENT
Start: 2017-07-03 | End: 2017-07-05

## 2017-07-03 RX ORDER — HYDROMORPHONE HYDROCHLORIDE 2 MG/ML
2 INJECTION INTRAMUSCULAR; INTRAVENOUS; SUBCUTANEOUS EVERY 6 HOURS
Qty: 0 | Refills: 0 | Status: DISCONTINUED | OUTPATIENT
Start: 2017-07-03 | End: 2017-07-05

## 2017-07-03 RX ORDER — SENNA PLUS 8.6 MG/1
2 TABLET ORAL AT BEDTIME
Qty: 0 | Refills: 0 | Status: DISCONTINUED | OUTPATIENT
Start: 2017-07-03 | End: 2017-07-06

## 2017-07-03 RX ORDER — MINERAL OIL
133 OIL (ML) MISCELLANEOUS ONCE
Qty: 0 | Refills: 0 | Status: DISCONTINUED | OUTPATIENT
Start: 2017-07-03 | End: 2017-07-06

## 2017-07-03 RX ORDER — MAGNESIUM HYDROXIDE 400 MG/1
30 TABLET, CHEWABLE ORAL AT BEDTIME
Qty: 0 | Refills: 0 | Status: DISCONTINUED | OUTPATIENT
Start: 2017-07-03 | End: 2017-07-06

## 2017-07-03 RX ORDER — ACETAMINOPHEN 500 MG
650 TABLET ORAL EVERY 8 HOURS
Qty: 0 | Refills: 0 | Status: DISCONTINUED | OUTPATIENT
Start: 2017-07-03 | End: 2017-07-06

## 2017-07-03 RX ORDER — DONEPEZIL HYDROCHLORIDE 10 MG/1
10 TABLET, FILM COATED ORAL AT BEDTIME
Qty: 0 | Refills: 0 | Status: DISCONTINUED | OUTPATIENT
Start: 2017-07-03 | End: 2017-07-06

## 2017-07-03 RX ADMIN — SENNA PLUS 2 TABLET(S): 8.6 TABLET ORAL at 21:36

## 2017-07-03 RX ADMIN — HYDROMORPHONE HYDROCHLORIDE 2 MILLIGRAM(S): 2 INJECTION INTRAMUSCULAR; INTRAVENOUS; SUBCUTANEOUS at 23:51

## 2017-07-03 RX ADMIN — CARVEDILOL PHOSPHATE 3.12 MILLIGRAM(S): 80 CAPSULE, EXTENDED RELEASE ORAL at 05:53

## 2017-07-03 RX ADMIN — ONDANSETRON 4 MILLIGRAM(S): 8 TABLET, FILM COATED ORAL at 06:16

## 2017-07-03 RX ADMIN — Medication 81 MILLIGRAM(S): at 12:31

## 2017-07-03 RX ADMIN — MORPHINE SULFATE 4 MILLIGRAM(S): 50 CAPSULE, EXTENDED RELEASE ORAL at 01:33

## 2017-07-03 RX ADMIN — DONEPEZIL HYDROCHLORIDE 10 MILLIGRAM(S): 10 TABLET, FILM COATED ORAL at 21:36

## 2017-07-03 RX ADMIN — MORPHINE SULFATE 2 MILLIGRAM(S): 50 CAPSULE, EXTENDED RELEASE ORAL at 01:20

## 2017-07-03 RX ADMIN — PANTOPRAZOLE SODIUM 40 MILLIGRAM(S): 20 TABLET, DELAYED RELEASE ORAL at 17:46

## 2017-07-03 RX ADMIN — Medication 650 MILLIGRAM(S): at 22:35

## 2017-07-03 RX ADMIN — HYDROMORPHONE HYDROCHLORIDE 2 MILLIGRAM(S): 2 INJECTION INTRAMUSCULAR; INTRAVENOUS; SUBCUTANEOUS at 15:35

## 2017-07-03 RX ADMIN — Medication 3: at 08:45

## 2017-07-03 RX ADMIN — HYDROMORPHONE HYDROCHLORIDE 2 MILLIGRAM(S): 2 INJECTION INTRAMUSCULAR; INTRAVENOUS; SUBCUTANEOUS at 16:35

## 2017-07-03 RX ADMIN — Medication 650 MILLIGRAM(S): at 00:19

## 2017-07-03 RX ADMIN — Medication 650 MILLIGRAM(S): at 21:35

## 2017-07-03 RX ADMIN — LISINOPRIL 5 MILLIGRAM(S): 2.5 TABLET ORAL at 05:54

## 2017-07-03 RX ADMIN — PANTOPRAZOLE SODIUM 40 MILLIGRAM(S): 20 TABLET, DELAYED RELEASE ORAL at 05:54

## 2017-07-03 RX ADMIN — MORPHINE SULFATE 2 MILLIGRAM(S): 50 CAPSULE, EXTENDED RELEASE ORAL at 00:22

## 2017-07-03 RX ADMIN — Medication 3: at 17:46

## 2017-07-03 RX ADMIN — Medication 3: at 12:31

## 2017-07-03 RX ADMIN — Medication 1: at 21:36

## 2017-07-03 RX ADMIN — MORPHINE SULFATE 4 MILLIGRAM(S): 50 CAPSULE, EXTENDED RELEASE ORAL at 01:50

## 2017-07-03 RX ADMIN — CARVEDILOL PHOSPHATE 3.12 MILLIGRAM(S): 80 CAPSULE, EXTENDED RELEASE ORAL at 17:47

## 2017-07-03 NOTE — CONSULT NOTE ADULT - PROBLEM SELECTOR RECOMMENDATION 9
Unclear etiology  Recommend IVF hydration, correction of electrolyte abnormalities  No surgical intervention at this time  Will need proper CT with PO/IV contrast if indicated  discussed with attending  Recommend admit to medicine for issues below
S/P Cardiac Catheterization  See Cath report-Will be medically managed

## 2017-07-03 NOTE — GOALS OF CARE CONVERSATION - PERSONAL ADVANCE DIRECTIVE - CONVERSATION DETAILS
Patient wants a natural death, without intubation or chest compressions if heart or breathing has stopped  She want to continue treating acute problems, but is aware she is on borrowed time

## 2017-07-03 NOTE — CONSULT NOTE ADULT - PROBLEM SELECTOR RECOMMENDATION 5
Initiate bowel regime Initiate bowel regime  Dulcolax supp>Fleet enema  restart chronic bowel regime

## 2017-07-03 NOTE — CONSULT NOTE ADULT - PROBLEM SELECTOR PROBLEM 1
Intractable vomiting with nausea, unspecified vomiting type
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

## 2017-07-03 NOTE — GOALS OF CARE CONVERSATION - PERSONAL ADVANCE DIRECTIVE - NS PRO AD PATIENT TYPE ON CHART
Health Care Proxy (HCP)/Do Not Resuscitate (DNR)/Medical Orders for Life-Sustaining Treatment (MOLST) Health Care Proxy (HCP)/Do Not Resuscitate (DNR)/Medical Orders for Life-Sustaining Treatment (MOLST)/MOLST completed today

## 2017-07-03 NOTE — CONSULT NOTE ADULT - ASSESSMENT
Assessment and Plan:   Problem/Plan - 1:  ·  Problem: CAD (coronary artery disease). Would benefit from CABG, but chronically ill and debilitated. Plan: no further intervention at this time; cont medical     Problem/Plan - 2:  ·  Problem: Intractable vomiting with nausea, unspecified vomiting type.  Plan: appears to be much improved today-No nausea or vomiting tolerated meal    Problem /Plan 3-     Chronic Pain C/O  severe R Hip pain, chronic low back pain      Problem/Plan - 4  ·  Problem: Atrial fibrillation, unspecified type.  Plan: no anticoagulation at this time, check pt/inr in am.     Problem/Plan 5  Constipation chronic problem-cannot remember when she had her last BM

## 2017-07-03 NOTE — GOALS OF CARE CONVERSATION - PERSONAL ADVANCE DIRECTIVE - TREATMENT GUIDELINE COMMENT
Continue medical treatment  Reorder Aricept    25 minute meeting at bedside with daughter and patient

## 2017-07-03 NOTE — PROGRESS NOTE ADULT - SUBJECTIVE AND OBJECTIVE BOX
SUBJECTIVE    REVIEW OF SYSTEMS    General: Not in any pain	    Skin/Breast: No rash  	  ENMT: No visual problems, no sore throat	    Respiratory and Thorax: No cough, No CP, No SOB  	  Cardiovascular: No CP, No palpitations    Gastrointestinal: No Abd pain, No N/V/D    Musculoskeletal: No Joint pain, No back pain	    Neurological: No headache    Psychiatric: No anxiety      OBJECTIVE    Vital Signs Last 24 Hrs  T(C): 36.9 (07-03-17 @ 17:47), Max: 37.2 (07-02-17 @ 22:47)  T(F): 98.4 (07-03-17 @ 17:47), Max: 98.9 (07-02-17 @ 22:47)  HR: 82 (07-03-17 @ 17:47) (73 - 85)  BP: 116/68 (07-03-17 @ 17:47) (112/68 - 138/70)  BP(mean): --  RR: 16 (07-03-17 @ 17:47) (16 - 16)  SpO2: 96% (07-03-17 @ 17:47) (95% - 100%)    PHYSICAL EXAM:    Constitutional: Not in any distress    Eyes: No conjunctival injection    ENMT: No oral lesions    Neck: No nodes, no adenopathy    Back: Straight, no defects    Respiratory: clear b/l    Cardiovascular: RRR, no murmur    Gastrointestinal: soft, NT, ND    Extremities: No edema, no erythema    Neurological: no focal deficit    Skin: No rash      MEDICATIONS  (STANDING):  aspirin  chewable 81 milliGRAM(s) Oral daily  pantoprazole    Tablet 40 milliGRAM(s) Oral two times a day before meals  carvedilol 3.125 milliGRAM(s) Oral every 12 hours  lisinopril 5 milliGRAM(s) Oral daily  insulin lispro (HumaLOG) corrective regimen sliding scale   SubCutaneous three times a day before meals  insulin lispro (HumaLOG) corrective regimen sliding scale   SubCutaneous at bedtime  dextrose 5%. 1000 milliLiter(s) (50 mL/Hr) IV Continuous <Continuous>  dextrose 50% Injectable 12.5 Gram(s) IV Push once  dextrose 50% Injectable 25 Gram(s) IV Push once  dextrose 50% Injectable 25 Gram(s) IV Push once  HYDROmorphone   Tablet 2 milliGRAM(s) Oral every 6 hours  lidocaine   Patch 2 Patch Transdermal daily  acetaminophen   Tablet. 650 milliGRAM(s) Oral every 8 hours  senna 2 Tablet(s) Oral at bedtime  donepezil 10 milliGRAM(s) Oral at bedtime    MEDICATIONS  (PRN):  ondansetron Injectable 4 milliGRAM(s) IV Push every 4 hours PRN Nausea and/or Vomiting  dextrose Gel 1 Dose(s) Oral once PRN Blood Glucose LESS THAN 70 milliGRAM(s)/deciliter  glucagon  Injectable 1 milliGRAM(s) IntraMuscular once PRN Glucose LESS THAN 70 milligrams/deciliter  HYDROmorphone   Tablet 4 milliGRAM(s) Oral every 4 hours PRN Severe Pain (7 - 10)  bisacodyl Suppository 10 milliGRAM(s) Rectal daily PRN Constipation  mineral oil enema 133 milliLiter(s) Rectal once PRN if No BM 2hrs after suppository  magnesium hydroxide Suspension 30 milliLiter(s) Oral at bedtime PRN Constipation                              9.2    6.9   )-----------( 234      ( 03 Jul 2017 10:16 )             30.4     03 Jul 2017 10:16    130    |  95     |  37.0   ----------------------------<  298    4.0     |  21.0   |  1.46     Ca    7.5        03 Jul 2017 10:16    TPro  5.5    /  Alb  2.6    /  TBili  1.0    /  DBili  x      /  AST  35     /  ALT  51     /  AlkPhos  70     03 Jul 2017 10:16    Allergies    penicillin (Hives)    Intolerances

## 2017-07-03 NOTE — CONSULT NOTE ADULT - SUBJECTIVE AND OBJECTIVE BOX
HPI:This is an 83yo F admitted for elevation of troponin level from [facility]. PMH of chronic Pain Syndrome, moderate to severe spinal, RHip pain.  Today she denies CP, Palpatations abdominal pain, N/V/D Is constipated. Ate her meal without experiencing GI distress.  Episode of oversedation from past opioid medication regime. She is moaning in her sleep. Wincing when turning in bed. Asking her daughter at bedside" WHY ME?"    84 years old female with PMH of A. Fib on Coumadin, CHF, DM, HTN, HLD, Colon Cancer s/p Resection, GERD and Dementia  sent from [facility] for evaluation of elevated Troponin I. As per patient, she is having nausea and vomiting for last 2 weeks and her appetite has decreased. She has had multiple episodes of vomiting associated with abdominal discomfort - mostly in epigastric region. Denies abdominal distention, black stool or blood in vomitus. Denies fever or eating anything unusual. Denies chest pain, palpitations, shortness of breath, cough, headache or dizziness.  She had Troponin I (unsure why it was performed) and it was elevated 0.353, so she was sent to [facility].  In ER, she had CT Abd/Pelvis which showed suspected perforation of stomach or duodenum -- seen by Surgery, no surgical intervention at this time as she does not have surgical abdomen.  Also found to have elevated INR (12.64) - no signs of active bleeding. Got Vit K 10 mg IVPB in ER and now getting FFPs x 2. (26 Jun 2017 23:46)      PERTINENT PMH REVIEWED: Yes     PAST MEDICAL & SURGICAL HISTORY:  HLD (hyperlipidemia)  Colon cancer  GERD (gastroesophageal reflux disease)  Atrial fibrillation  Diabetes  Hypertension  S/P hip replacement, right  H/O exploratory laparotomy: for colon cancer  History of appendectomy      SOCIAL HISTORY:  EtOH    No                                    Drugs    Chronic Pain syndrome-became dependent on OPIOIDS                                 nonsmoker                                    Admitted from: home  [facility] _[facility] HCP:  HusbandYonatan Germain (spouse) [phone]______    FAMILY HISTORY:  Family history of diabetes mellitus (Father)      Allergies    penicillin (Hives)    Intolerances        Baseline ADLs (prior to admission):  In [facility] partially Dependent      Present Symptoms:     Dyspnea: 1 SOB on exertion  Nausea/Vomiting:  No  Anxiety:   No  Depression: Yes ??  Fatigue: Yes   Loss of appetite: Yes     Pain: Right hip localized aching throbbing pain, Lower back spinal stenosis worse when on feet, ambulating            Character-            Duration-            Effect-            Factors-            Frequency-            Location-            Severity-    Review of Systems: Reviewed                       All others negative    MEDICATIONS  (STANDING):  aspirin  chewable 81 milliGRAM(s) Oral daily  pantoprazole    Tablet 40 milliGRAM(s) Oral two times a day before meals  carvedilol 3.125 milliGRAM(s) Oral every 12 hours  lisinopril 5 milliGRAM(s) Oral daily  insulin lispro (HumaLOG) corrective regimen sliding scale   SubCutaneous three times a day before meals  insulin lispro (HumaLOG) corrective regimen sliding scale   SubCutaneous at bedtime  dextrose 5%. 1000 milliLiter(s) (50 mL/Hr) IV Continuous <Continuous>  dextrose 50% Injectable 12.5 Gram(s) IV Push once  dextrose 50% Injectable 25 Gram(s) IV Push once  dextrose 50% Injectable 25 Gram(s) IV Push once  HYDROmorphone   Tablet 2 milliGRAM(s) Oral every 6 hours  lidocaine   Patch 2 Patch Transdermal daily    MEDICATIONS  (PRN):  ondansetron Injectable 4 milliGRAM(s) IV Push every 4 hours PRN Nausea and/or Vomiting  dextrose Gel 1 Dose(s) Oral once PRN Blood Glucose LESS THAN 70 milliGRAM(s)/deciliter  glucagon  Injectable 1 milliGRAM(s) IntraMuscular once PRN Glucose LESS THAN 70 milligrams/deciliter  HYDROmorphone   Tablet 4 milliGRAM(s) Oral every 4 hours PRN Severe Pain (7 - 10)  bisacodyl Suppository 10 milliGRAM(s) Rectal daily PRN Constipation  mineral oil enema 133 milliLiter(s) Rectal once PRN if No BM 2hrs after suppository  acetaminophen   Tablet. 650 milliGRAM(s) Oral every 8 hours PRN chronic hip pain      PHYSICAL EXAM:    Vital Signs Last 24 Hrs  T(C): 36.2 (03 Jul 2017 07:50), Max: 37.2 (02 Jul 2017 22:47)  T(F): 97.1 (03 Jul 2017 07:50), Max: 98.9 (02 Jul 2017 22:47)  HR: 85 (03 Jul 2017 07:50) (80 - 88)  BP: 131/85 (03 Jul 2017 07:50) (112/68 - 138/70)  BP(mean): --  RR: 16 (03 Jul 2017 05:50) (14 - 16)  SpO2: 95% (03 Jul 2017 07:50) (95% - 100%)    General: alert  oriented x _3___ Obese    HEENT: normal      Lungs    labored breathing when sleeping at times    CV: normal      GI:  distended   no BS             constipation  last BM: Unknown  chronically constipated    : normal  incontinent    MSK: weakness  edema              bedbound/wheelchair bound    Skin: normal  did not assess  LABS:                        9.2    6.9   )-----------( 234      ( 03 Jul 2017 10:16 )             30.4     07-03    130<L>  |  95<L>  |  37.0<H>  ----------------------------<  298<H>  4.0   |  21.0<L>  |  1.46<H>    Ca    7.5<L>      03 Jul 2017 10:16    TPro  5.5<L>  /  Alb  2.6<L>  /  TBili  1.0  /  DBili  x   /  AST  35<H>  /  ALT  51<H>  /  AlkPhos  70  07-03    PT/INR - ( 03 Jul 2017 10:16 )   PT: 18.1 sec;   INR: 1.63 ratio         I&O's Summary    02 Jul 2017 07:01  -  03 Jul 2017 07:00  --------------------------------------------------------  IN: 600 mL / OUT: 0 mL / NET: 600 mL    03 Jul 2017 07:01  -  03 Jul 2017 15:25  --------------------------------------------------------  IN: 620 mL / OUT: 0 mL / NET: 620 mL        RADIOLOGY & ADDITIONAL STUDIES:    ADVANCE DIRECTIVES:   DNR  NO  Completed on:                     [facility]  NO   Completed on: Today 07/03/17  Living Will  YES   Completed on: Oct 14, 2013 HPI:This is an 83yo F admitted for elevation of troponin level from [facility]. PMH of chronic Pain Syndrome, moderate to severe spinal, RHip pain.  Today she denies CP, Palpatations abdominal pain, N/V/D Is constipated. Ate her meal without experiencing GI distress.  Episode of oversedation from past opioid medication regime. She is moaning in her sleep. Wincing when turning in bed. Asking her daughter at bedside" WHY ME?"    84 years old female with PMH of A. Fib on Coumadin, CHF, DM, HTN, HLD, Colon Cancer s/p Resection, GERD and Dementia  sent from [facility] for evaluation of elevated Troponin I. As per patient, she is having nausea and vomiting for last 2 weeks and her appetite has decreased. She has had multiple episodes of vomiting associated with abdominal discomfort - mostly in epigastric region. Denies abdominal distention, black stool or blood in vomitus. Denies fever or eating anything unusual. Denies chest pain, palpitations, shortness of breath, cough, headache or dizziness.  She had Troponin I (unsure why it was performed) and it was elevated 0.353, so she was sent to [facility].  In ER, she had CT Abd/Pelvis which showed suspected perforation of stomach or duodenum -- seen by Surgery, no surgical intervention at this time as she does not have surgical abdomen.  Also found to have elevated INR (12.64) - no signs of active bleeding. Got Vit K 10 mg IVPB in ER and now getting FFPs x 2. (26 Jun 2017 23:46)      PERTINENT PMH REVIEWED: Yes     PAST MEDICAL & SURGICAL HISTORY:  HLD (hyperlipidemia)  Colon cancer  GERD (gastroesophageal reflux disease)  Atrial fibrillation  Diabetes  Hypertension  S/P hip replacement, right  H/O exploratory laparotomy: for colon cancer  History of appendectomy      SOCIAL HISTORY:  EtOH    No                                    Drugs    Chronic Pain syndrome-became dependent on OPIOIDS                                  smoking history                                   Admitted from:   [facility] _[facility] HCP:  HusbandYonatan Germain (spouse) [phone]______    FAMILY HISTORY:  Family history of diabetes mellitus (Father)    Allergies  penicillin (Hives)    Baseline ADLs (prior to admission):  In [facility] partially Dependent      Present Symptoms:     Dyspnea: 1 SOB on exertion  Nausea/Vomiting:  No  Anxiety:   No  Depression: Yes ??  Fatigue: Yes   Loss of appetite: Yes     Pain: Right hip localized aching throbbing pain, Lower back spinal stenosis worse when on feet, ambulating            Character-            Duration-            Effect-            Factors-            Frequency-            Location-            Severity-    Review of Systems: Reviewed                       All others negative    MEDICATIONS  (STANDING):  aspirin  chewable 81 milliGRAM(s) Oral daily  pantoprazole    Tablet 40 milliGRAM(s) Oral two times a day before meals  carvedilol 3.125 milliGRAM(s) Oral every 12 hours  lisinopril 5 milliGRAM(s) Oral daily  insulin lispro (HumaLOG) corrective regimen sliding scale   SubCutaneous three times a day before meals  insulin lispro (HumaLOG) corrective regimen sliding scale   SubCutaneous at bedtime  dextrose 5%. 1000 milliLiter(s) (50 mL/Hr) IV Continuous <Continuous>  dextrose 50% Injectable 12.5 Gram(s) IV Push once  dextrose 50% Injectable 25 Gram(s) IV Push once  dextrose 50% Injectable 25 Gram(s) IV Push once  HYDROmorphone   Tablet 2 milliGRAM(s) Oral every 6 hours  lidocaine   Patch 2 Patch Transdermal daily    MEDICATIONS  (PRN):  ondansetron Injectable 4 milliGRAM(s) IV Push every 4 hours PRN Nausea and/or Vomiting  dextrose Gel 1 Dose(s) Oral once PRN Blood Glucose LESS THAN 70 milliGRAM(s)/deciliter  glucagon  Injectable 1 milliGRAM(s) IntraMuscular once PRN Glucose LESS THAN 70 milligrams/deciliter  HYDROmorphone   Tablet 4 milliGRAM(s) Oral every 4 hours PRN Severe Pain (7 - 10)  bisacodyl Suppository 10 milliGRAM(s) Rectal daily PRN Constipation  mineral oil enema 133 milliLiter(s) Rectal once PRN if No BM 2hrs after suppository  acetaminophen   Tablet. 650 milliGRAM(s) Oral every 8 hours PRN chronic hip pain      PHYSICAL EXAM:    Vital Signs Last 24 Hrs  T(C): 36.2 (03 Jul 2017 07:50), Max: 37.2 (02 Jul 2017 22:47)  T(F): 97.1 (03 Jul 2017 07:50), Max: 98.9 (02 Jul 2017 22:47)  HR: 85 (03 Jul 2017 07:50) (80 - 88)  BP: 131/85 (03 Jul 2017 07:50) (112/68 - 138/70)  BP(mean): --  RR: 16 (03 Jul 2017 05:50) (14 - 16)  SpO2: 95% (03 Jul 2017 07:50) (95% - 100%)    General: alert  oriented x _3___ Obese    HEENT: normal      Lungs    labored breathing when sleeping at times    CV: normal      GI:  distended   no BS             constipation  last BM: Unknown  chronically constipated    : normal  incontinent    MSK: weakness  edema              bedbound/wheelchair bound    Skin: normal  did not assess  LABS:                        9.2    6.9   )-----------( 234      ( 03 Jul 2017 10:16 )             30.4     07-03    130<L>  |  95<L>  |  37.0<H>  ----------------------------<  298<H>  4.0   |  21.0<L>  |  1.46<H>    Ca    7.5<L>      03 Jul 2017 10:16    TPro  5.5<L>  /  Alb  2.6<L>  /  TBili  1.0  /  DBili  x   /  AST  35<H>  /  ALT  51<H>  /  AlkPhos  70  07-03    PT/INR - ( 03 Jul 2017 10:16 )   PT: 18.1 sec;   INR: 1.63 ratio         I&O's Summary    02 Jul 2017 07:01  -  03 Jul 2017 07:00  --------------------------------------------------------  IN: 600 mL / OUT: 0 mL / NET: 600 mL    03 Jul 2017 07:01  -  03 Jul 2017 15:25  --------------------------------------------------------  IN: 620 mL / OUT: 0 mL / NET: 620 mL        RADIOLOGY & ADDITIONAL STUDIES:    ADVANCE DIRECTIVES:   DNR  NO  [facility]   Completed on: Today 07/03/17  Living Will  YES   Completed on: Oct 14, 2013 HPI:This is an 85yo F admitted for elevation of troponin level from [facility]. PMH of chronic Pain Syndrome, moderate to severe spinal, RHip pain.  Today she denies CP, Palpatations abdominal pain, N/V/D Is constipated. Ate her meal without experiencing GI distress.  Episode of oversedation from past opioid medication regime. She is moaning in her sleep. Wincing when turning in bed. Asking her daughter at bedside" WHY ME?"    84 years old female with PMH of A. Fib on Coumadin, CHF, DM, HTN, HLD, Colon Cancer s/p Resection, GERD and Dementia  sent from [facility] for evaluation of elevated Troponin I. As per patient, she is having nausea and vomiting for last 2 weeks and her appetite has decreased. She has had multiple episodes of vomiting associated with abdominal discomfort - mostly in epigastric region. Denies abdominal distention, black stool or blood in vomitus. Denies fever or eating anything unusual. Denies chest pain, palpitations, shortness of breath, cough, headache or dizziness.  She had Troponin I (unsure why it was performed) and it was elevated 0.353, so she was sent to [facility].  In ER, she had CT Abd/Pelvis which showed suspected perforation of stomach or duodenum -- seen by Surgery, no surgical intervention at this time as she does not have surgical abdomen.  Also found to have elevated INR (12.64) - no signs of active bleeding. Got Vit K 10 mg IVPB in ER and now getting FFPs x 2. (26 Jun 2017 23:46)      PERTINENT PMH REVIEWED: Yes     PAST MEDICAL & SURGICAL HISTORY:  HLD (hyperlipidemia)  Colon cancer  GERD (gastroesophageal reflux disease)  Atrial fibrillation  Diabetes  Hypertension  S/P hip replacement, right  H/O exploratory laparotomy: for colon cancer  History of appendectomy      SOCIAL HISTORY:  EtOH    No                                    Drugs    Chronic Pain syndrome-became dependent on OPIOIDS                                  smoking history                                   Admitted from:   [facility] _[facility] HCP:  HusbandYonatan Germain (spouse) [phone]______    FAMILY HISTORY:  Family history of diabetes mellitus (Father)    Allergies  penicillin (Hives)    Baseline ADLs (prior to admission):  In [facility] partially Dependent      Present Symptoms:     Dyspnea: 1 SOB on exertion  Nausea/Vomiting:  Not today  Anxiety:   No  Depression: Yes ??  Fatigue: Yes   Loss of appetite: Yes     Pain: Right hip localized aching throbbing pain, Lower back spinal stenosis worse when on feet, ambulating            Character-            Duration-            Effect-            Factors-            Frequency-            Location-            Severity-    Review of Systems: Reviewed                       All others negative    MEDICATIONS  (STANDING):  aspirin  chewable 81 milliGRAM(s) Oral daily  pantoprazole    Tablet 40 milliGRAM(s) Oral two times a day before meals  carvedilol 3.125 milliGRAM(s) Oral every 12 hours  lisinopril 5 milliGRAM(s) Oral daily  insulin lispro (HumaLOG) corrective regimen sliding scale   SubCutaneous three times a day before meals  insulin lispro (HumaLOG) corrective regimen sliding scale   SubCutaneous at bedtime  dextrose 5%. 1000 milliLiter(s) (50 mL/Hr) IV Continuous <Continuous>  dextrose 50% Injectable 12.5 Gram(s) IV Push once  dextrose 50% Injectable 25 Gram(s) IV Push once  dextrose 50% Injectable 25 Gram(s) IV Push once  HYDROmorphone   Tablet 2 milliGRAM(s) Oral every 6 hours  lidocaine   Patch 2 Patch Transdermal daily    MEDICATIONS  (PRN):  ondansetron Injectable 4 milliGRAM(s) IV Push every 4 hours PRN Nausea and/or Vomiting  dextrose Gel 1 Dose(s) Oral once PRN Blood Glucose LESS THAN 70 milliGRAM(s)/deciliter  glucagon  Injectable 1 milliGRAM(s) IntraMuscular once PRN Glucose LESS THAN 70 milligrams/deciliter  HYDROmorphone   Tablet 4 milliGRAM(s) Oral every 4 hours PRN Severe Pain (7 - 10)  bisacodyl Suppository 10 milliGRAM(s) Rectal daily PRN Constipation  mineral oil enema 133 milliLiter(s) Rectal once PRN if No BM 2hrs after suppository  acetaminophen   Tablet. 650 milliGRAM(s) Oral every 8 hours PRN chronic hip pain      PHYSICAL EXAM:    Vital Signs Last 24 Hrs  T(C): 36.2 (03 Jul 2017 07:50), Max: 37.2 (02 Jul 2017 22:47)  T(F): 97.1 (03 Jul 2017 07:50), Max: 98.9 (02 Jul 2017 22:47)  HR: 85 (03 Jul 2017 07:50) (80 - 88)  BP: 131/85 (03 Jul 2017 07:50) (112/68 - 138/70)  BP(mean): --  RR: 16 (03 Jul 2017 05:50) (14 - 16)  SpO2: 95% (03 Jul 2017 07:50) (95% - 100%)    General: alert  oriented x _3___ Obese    HEENT: normal      Lungs    labored breathing when sleeping at times    CV: normal      GI:  distended   no              constipation  last BM: Unknown  chronically constipated    : normal  incontinent    MSK: weakness  edema              bedbound/wheelchair bound    Skin: normal  did not assess  LABS:                        9.2    6.9   )-----------( 234      ( 03 Jul 2017 10:16 )             30.4     07-03    130<L>  |  95<L>  |  37.0<H>  ----------------------------<  298<H>  4.0   |  21.0<L>  |  1.46<H>    Ca    7.5<L>      03 Jul 2017 10:16    TPro  5.5<L>  /  Alb  2.6<L>  /  TBili  1.0  /  DBili  x   /  AST  35<H>  /  ALT  51<H>  /  AlkPhos  70  07-03    PT/INR - ( 03 Jul 2017 10:16 )   PT: 18.1 sec;   INR: 1.63 ratio         I&O's Summary    02 Jul 2017 07:01  -  03 Jul 2017 07:00  --------------------------------------------------------  IN: 600 mL / OUT: 0 mL / NET: 600 mL    03 Jul 2017 07:01  -  03 Jul 2017 15:25  --------------------------------------------------------  IN: 620 mL / OUT: 0 mL / NET: 620 mL        RADIOLOGY & ADDITIONAL STUDIES:    ADVANCE DIRECTIVES:   DNR  NO  [facility]   Completed on: Today 07/03/17  Living Will  YES   Completed on: Oct 14, 2013

## 2017-07-03 NOTE — CONSULT NOTE ADULT - PROBLEM SELECTOR RECOMMENDATION 3
DC Morphine-hard on kidneys  Dilaudid 2mg PO Q6h ATC  Lidoderm patch DC Morphine-hard on kidneys  Dilaudid 2mg PO Q6h ATC  Lidoderm patch  Recommend Starting Fentanyl patch tomorrow at 12mcg/hr

## 2017-07-03 NOTE — CONSULT NOTE ADULT - ATTENDING COMMENTS
COUNSELING:    Face to face meeting to discuss Advanced Care Planning - Time Spent ______ Minutes.  See goals of care note.    More than 50% time spent in counseling and coordinating care. ___60___ Minutes.     Thank you for the opportunity to assist with the care of this patient.   [facility] [phone].
The patient was seen and examined. She is an 84-year-old female who presents with vomiting.she was ruled in for non-Q-wave myocardial infarction. She has an ejection fraction of less than 20%. She has severe triple vessel coronary artery disease. The patient is at increased risk for complications due to surgery secondary to her age and comorbid conditions. Because of her moderately has not been elucidated. We will discuss with her primary care physicianand cardiologist the best course of management.

## 2017-07-04 RX ORDER — IRON SUCROSE 20 MG/ML
200 INJECTION, SOLUTION INTRAVENOUS ONCE
Qty: 0 | Refills: 0 | Status: DISCONTINUED | OUTPATIENT
Start: 2017-07-04 | End: 2017-07-04

## 2017-07-04 RX ADMIN — Medication 650 MILLIGRAM(S): at 10:00

## 2017-07-04 RX ADMIN — PANTOPRAZOLE SODIUM 40 MILLIGRAM(S): 20 TABLET, DELAYED RELEASE ORAL at 17:01

## 2017-07-04 RX ADMIN — HYDROMORPHONE HYDROCHLORIDE 2 MILLIGRAM(S): 2 INJECTION INTRAMUSCULAR; INTRAVENOUS; SUBCUTANEOUS at 17:30

## 2017-07-04 RX ADMIN — Medication 4: at 12:13

## 2017-07-04 RX ADMIN — PANTOPRAZOLE SODIUM 40 MILLIGRAM(S): 20 TABLET, DELAYED RELEASE ORAL at 05:18

## 2017-07-04 RX ADMIN — HYDROMORPHONE HYDROCHLORIDE 2 MILLIGRAM(S): 2 INJECTION INTRAMUSCULAR; INTRAVENOUS; SUBCUTANEOUS at 11:45

## 2017-07-04 RX ADMIN — HYDROMORPHONE HYDROCHLORIDE 2 MILLIGRAM(S): 2 INJECTION INTRAMUSCULAR; INTRAVENOUS; SUBCUTANEOUS at 23:30

## 2017-07-04 RX ADMIN — HYDROMORPHONE HYDROCHLORIDE 2 MILLIGRAM(S): 2 INJECTION INTRAMUSCULAR; INTRAVENOUS; SUBCUTANEOUS at 17:05

## 2017-07-04 RX ADMIN — LIDOCAINE 2 PATCH: 4 CREAM TOPICAL at 22:23

## 2017-07-04 RX ADMIN — HYDROMORPHONE HYDROCHLORIDE 2 MILLIGRAM(S): 2 INJECTION INTRAMUSCULAR; INTRAVENOUS; SUBCUTANEOUS at 11:11

## 2017-07-04 RX ADMIN — HYDROMORPHONE HYDROCHLORIDE 2 MILLIGRAM(S): 2 INJECTION INTRAMUSCULAR; INTRAVENOUS; SUBCUTANEOUS at 00:51

## 2017-07-04 RX ADMIN — Medication 5: at 17:06

## 2017-07-04 RX ADMIN — HYDROMORPHONE HYDROCHLORIDE 2 MILLIGRAM(S): 2 INJECTION INTRAMUSCULAR; INTRAVENOUS; SUBCUTANEOUS at 22:23

## 2017-07-04 RX ADMIN — HYDROMORPHONE HYDROCHLORIDE 2 MILLIGRAM(S): 2 INJECTION INTRAMUSCULAR; INTRAVENOUS; SUBCUTANEOUS at 06:17

## 2017-07-04 RX ADMIN — LISINOPRIL 5 MILLIGRAM(S): 2.5 TABLET ORAL at 05:18

## 2017-07-04 RX ADMIN — CARVEDILOL PHOSPHATE 3.12 MILLIGRAM(S): 80 CAPSULE, EXTENDED RELEASE ORAL at 17:01

## 2017-07-04 RX ADMIN — Medication 81 MILLIGRAM(S): at 11:06

## 2017-07-04 RX ADMIN — LIDOCAINE 2 PATCH: 4 CREAM TOPICAL at 10:04

## 2017-07-04 RX ADMIN — HYDROMORPHONE HYDROCHLORIDE 2 MILLIGRAM(S): 2 INJECTION INTRAMUSCULAR; INTRAVENOUS; SUBCUTANEOUS at 05:17

## 2017-07-04 RX ADMIN — SENNA PLUS 2 TABLET(S): 8.6 TABLET ORAL at 22:21

## 2017-07-04 RX ADMIN — Medication 650 MILLIGRAM(S): at 09:35

## 2017-07-04 RX ADMIN — CARVEDILOL PHOSPHATE 3.12 MILLIGRAM(S): 80 CAPSULE, EXTENDED RELEASE ORAL at 05:18

## 2017-07-04 RX ADMIN — DONEPEZIL HYDROCHLORIDE 10 MILLIGRAM(S): 10 TABLET, FILM COATED ORAL at 22:21

## 2017-07-04 RX ADMIN — Medication 1: at 08:46

## 2017-07-04 NOTE — PROGRESS NOTE ADULT - SUBJECTIVE AND OBJECTIVE BOX
SUBJECTIVE    REVIEW OF SYSTEMS    General: + pain left hip    Skin/Breast: No rash  	  ENMT: No visual problems, no sore throat	    Respiratory and Thorax: No cough, No CP, No SOB  	  Cardiovascular: No CP, No palpitations    Gastrointestinal: No Abd pain, No N/V/D    Musculoskeletal: + Joint pain, No back pain	    Neurological: No headache    Psychiatric: No anxiety      OBJECTIVE    Vital Signs Last 24 Hrs  T(C): 36.7 (07-04-17 @ 07:17), Max: 36.9 (07-03-17 @ 17:47)  T(F): 98.1 (07-04-17 @ 07:17), Max: 98.4 (07-03-17 @ 17:47)  HR: 67 (07-04-17 @ 07:17) (67 - 85)  BP: 130/70 (07-04-17 @ 07:17) (112/70 - 130/70)  BP(mean): --  RR: 14 (07-04-17 @ 07:17) (14 - 16)  SpO2: 100% (07-04-17 @ 07:17) (96% - 100%)    PHYSICAL EXAM:    Constitutional: Not in any distress    Eyes: No conjunctival injection    ENMT: No oral lesions    Neck: No nodes, no adenopathy    Back: Straight, no defects    Respiratory: clear b/l    Cardiovascular: RRR, no murmur    Gastrointestinal: soft, NT, ND    Extremities: no erythem, edema    Neurological: no focal deficit    Skin: No rash      MEDICATIONS  (STANDING):  aspirin  chewable 81 milliGRAM(s) Oral daily  pantoprazole    Tablet 40 milliGRAM(s) Oral two times a day before meals  carvedilol 3.125 milliGRAM(s) Oral every 12 hours  lisinopril 5 milliGRAM(s) Oral daily  insulin lispro (HumaLOG) corrective regimen sliding scale   SubCutaneous three times a day before meals  insulin lispro (HumaLOG) corrective regimen sliding scale   SubCutaneous at bedtime  dextrose 5%. 1000 milliLiter(s) (50 mL/Hr) IV Continuous <Continuous>  dextrose 50% Injectable 12.5 Gram(s) IV Push once  dextrose 50% Injectable 25 Gram(s) IV Push once  dextrose 50% Injectable 25 Gram(s) IV Push once  HYDROmorphone   Tablet 2 milliGRAM(s) Oral every 6 hours  lidocaine   Patch 2 Patch Transdermal daily  acetaminophen   Tablet. 650 milliGRAM(s) Oral every 8 hours  senna 2 Tablet(s) Oral at bedtime  donepezil 10 milliGRAM(s) Oral at bedtime  iron sucrose IVPB 200 milliGRAM(s) IV Intermittent once    MEDICATIONS  (PRN):  ondansetron Injectable 4 milliGRAM(s) IV Push every 4 hours PRN Nausea and/or Vomiting  dextrose Gel 1 Dose(s) Oral once PRN Blood Glucose LESS THAN 70 milliGRAM(s)/deciliter  glucagon  Injectable 1 milliGRAM(s) IntraMuscular once PRN Glucose LESS THAN 70 milligrams/deciliter  HYDROmorphone   Tablet 4 milliGRAM(s) Oral every 4 hours PRN Severe Pain (7 - 10)  bisacodyl Suppository 10 milliGRAM(s) Rectal daily PRN Constipation  mineral oil enema 133 milliLiter(s) Rectal once PRN if No BM 2hrs after suppository  magnesium hydroxide Suspension 30 milliLiter(s) Oral at bedtime PRN Constipation                              9.2    6.9   )-----------( 234      ( 03 Jul 2017 10:16 )             30.4     03 Jul 2017 10:16    130    |  95     |  37.0   ----------------------------<  298    4.0     |  21.0   |  1.46     Ca    7.5        03 Jul 2017 10:16    TPro  5.5    /  Alb  2.6    /  TBili  1.0    /  DBili  x      /  AST  35     /  ALT  51     /  AlkPhos  70     03 Jul 2017 10:16    Allergies    penicillin (Hives)    Intolerances

## 2017-07-04 NOTE — PROGRESS NOTE ADULT - PROBLEM SELECTOR PLAN 2
venofer for anemia; palliative care assistance and recommendations greatly appreciated; consider change to PO pain meds

## 2017-07-05 RX ORDER — MORPHINE SULFATE 50 MG/1
4 CAPSULE, EXTENDED RELEASE ORAL EVERY 4 HOURS
Qty: 0 | Refills: 0 | Status: DISCONTINUED | OUTPATIENT
Start: 2017-07-05 | End: 2017-07-06

## 2017-07-05 RX ORDER — MORPHINE SULFATE 50 MG/1
10 CAPSULE, EXTENDED RELEASE ORAL EVERY 4 HOURS
Qty: 0 | Refills: 0 | Status: DISCONTINUED | OUTPATIENT
Start: 2017-07-05 | End: 2017-07-06

## 2017-07-05 RX ORDER — MORPHINE SULFATE 50 MG/1
0.5 CAPSULE, EXTENDED RELEASE ORAL ONCE
Qty: 0 | Refills: 0 | Status: DISCONTINUED | OUTPATIENT
Start: 2017-07-05 | End: 2017-07-05

## 2017-07-05 RX ORDER — MORPHINE SULFATE 50 MG/1
15 CAPSULE, EXTENDED RELEASE ORAL EVERY 4 HOURS
Qty: 0 | Refills: 0 | Status: DISCONTINUED | OUTPATIENT
Start: 2017-07-05 | End: 2017-07-06

## 2017-07-05 RX ORDER — MORPHINE SULFATE 50 MG/1
2 CAPSULE, EXTENDED RELEASE ORAL EVERY 4 HOURS
Qty: 0 | Refills: 0 | Status: DISCONTINUED | OUTPATIENT
Start: 2017-07-05 | End: 2017-07-06

## 2017-07-05 RX ADMIN — Medication 650 MILLIGRAM(S): at 16:00

## 2017-07-05 RX ADMIN — HYDROMORPHONE HYDROCHLORIDE 2 MILLIGRAM(S): 2 INJECTION INTRAMUSCULAR; INTRAVENOUS; SUBCUTANEOUS at 06:27

## 2017-07-05 RX ADMIN — Medication 81 MILLIGRAM(S): at 12:11

## 2017-07-05 RX ADMIN — HYDROMORPHONE HYDROCHLORIDE 4 MILLIGRAM(S): 2 INJECTION INTRAMUSCULAR; INTRAVENOUS; SUBCUTANEOUS at 02:10

## 2017-07-05 RX ADMIN — Medication 3: at 12:11

## 2017-07-05 RX ADMIN — Medication 3: at 22:28

## 2017-07-05 RX ADMIN — MORPHINE SULFATE 0.5 MILLIGRAM(S): 50 CAPSULE, EXTENDED RELEASE ORAL at 16:30

## 2017-07-05 RX ADMIN — Medication 650 MILLIGRAM(S): at 15:02

## 2017-07-05 RX ADMIN — Medication 650 MILLIGRAM(S): at 22:33

## 2017-07-05 RX ADMIN — MORPHINE SULFATE 15 MILLIGRAM(S): 50 CAPSULE, EXTENDED RELEASE ORAL at 20:37

## 2017-07-05 RX ADMIN — Medication 3: at 08:50

## 2017-07-05 RX ADMIN — DONEPEZIL HYDROCHLORIDE 10 MILLIGRAM(S): 10 TABLET, FILM COATED ORAL at 22:28

## 2017-07-05 RX ADMIN — MORPHINE SULFATE 15 MILLIGRAM(S): 50 CAPSULE, EXTENDED RELEASE ORAL at 21:00

## 2017-07-05 RX ADMIN — CARVEDILOL PHOSPHATE 3.12 MILLIGRAM(S): 80 CAPSULE, EXTENDED RELEASE ORAL at 17:45

## 2017-07-05 RX ADMIN — HYDROMORPHONE HYDROCHLORIDE 2 MILLIGRAM(S): 2 INJECTION INTRAMUSCULAR; INTRAVENOUS; SUBCUTANEOUS at 05:27

## 2017-07-05 RX ADMIN — LISINOPRIL 5 MILLIGRAM(S): 2.5 TABLET ORAL at 05:25

## 2017-07-05 RX ADMIN — LIDOCAINE 2 PATCH: 4 CREAM TOPICAL at 12:07

## 2017-07-05 RX ADMIN — PANTOPRAZOLE SODIUM 40 MILLIGRAM(S): 20 TABLET, DELAYED RELEASE ORAL at 05:25

## 2017-07-05 RX ADMIN — MORPHINE SULFATE 0.5 MILLIGRAM(S): 50 CAPSULE, EXTENDED RELEASE ORAL at 16:06

## 2017-07-05 RX ADMIN — Medication 4: at 17:45

## 2017-07-05 RX ADMIN — PANTOPRAZOLE SODIUM 40 MILLIGRAM(S): 20 TABLET, DELAYED RELEASE ORAL at 17:46

## 2017-07-05 RX ADMIN — HYDROMORPHONE HYDROCHLORIDE 4 MILLIGRAM(S): 2 INJECTION INTRAMUSCULAR; INTRAVENOUS; SUBCUTANEOUS at 01:10

## 2017-07-05 RX ADMIN — Medication 650 MILLIGRAM(S): at 22:28

## 2017-07-05 RX ADMIN — CARVEDILOL PHOSPHATE 3.12 MILLIGRAM(S): 80 CAPSULE, EXTENDED RELEASE ORAL at 05:25

## 2017-07-05 NOTE — PROGRESS NOTE ADULT - SUBJECTIVE AND OBJECTIVE BOX
SUBJECTIVE    REVIEW OF SYSTEMS    General: Not in any pain	    Skin/Breast: No rash  	  ENMT: No visual problems, no sore throat	    Respiratory and Thorax: No cough, No CP, No SOB  	  Cardiovascular: No CP, No palpitations    Gastrointestinal: No Abd pain, No N/V/D    Musculoskeletal: No Joint pain, No back pain	    Neurological: No headache    Psychiatric: No anxiety      OBJECTIVE    Vital Signs Last 24 Hrs  T(C): 37.1 (07-05-17 @ 08:30), Max: 37.2 (07-04-17 @ 16:57)  T(F): 98.8 (07-05-17 @ 08:30), Max: 98.9 (07-04-17 @ 16:57)  HR: 83 (07-05-17 @ 08:30) (68 - 86)  BP: 157/78 (07-05-17 @ 08:30) (118/66 - 157/78)  BP(mean): --  RR: 18 (07-05-17 @ 08:30) (16 - 18)  SpO2: 96% (07-05-17 @ 08:30) (96% - 99%)    PHYSICAL EXAM:    Constitutional: Not in any distress    Eyes: No conjunctival injection    ENMT: No oral lesions    Neck: No nodes, no adenopathy    Back: Straight, no defects    Respiratory: clear b/l    Cardiovascular: RRR, no murmur    Gastrointestinal: soft, NT, ND    Extremities: No edema, no erythema    Neurological: no focal deficit    Skin: No rash      MEDICATIONS  (STANDING):  aspirin  chewable 81 milliGRAM(s) Oral daily  pantoprazole    Tablet 40 milliGRAM(s) Oral two times a day before meals  carvedilol 3.125 milliGRAM(s) Oral every 12 hours  lisinopril 5 milliGRAM(s) Oral daily  insulin lispro (HumaLOG) corrective regimen sliding scale   SubCutaneous three times a day before meals  insulin lispro (HumaLOG) corrective regimen sliding scale   SubCutaneous at bedtime  dextrose 5%. 1000 milliLiter(s) (50 mL/Hr) IV Continuous <Continuous>  dextrose 50% Injectable 12.5 Gram(s) IV Push once  dextrose 50% Injectable 25 Gram(s) IV Push once  dextrose 50% Injectable 25 Gram(s) IV Push once  HYDROmorphone   Tablet 2 milliGRAM(s) Oral every 6 hours  lidocaine   Patch 2 Patch Transdermal daily  acetaminophen   Tablet. 650 milliGRAM(s) Oral every 8 hours  senna 2 Tablet(s) Oral at bedtime  donepezil 10 milliGRAM(s) Oral at bedtime    MEDICATIONS  (PRN):  ondansetron Injectable 4 milliGRAM(s) IV Push every 4 hours PRN Nausea and/or Vomiting  dextrose Gel 1 Dose(s) Oral once PRN Blood Glucose LESS THAN 70 milliGRAM(s)/deciliter  glucagon  Injectable 1 milliGRAM(s) IntraMuscular once PRN Glucose LESS THAN 70 milligrams/deciliter  HYDROmorphone   Tablet 4 milliGRAM(s) Oral every 4 hours PRN Severe Pain (7 - 10)  bisacodyl Suppository 10 milliGRAM(s) Rectal daily PRN Constipation  mineral oil enema 133 milliLiter(s) Rectal once PRN if No BM 2hrs after suppository  magnesium hydroxide Suspension 30 milliLiter(s) Oral at bedtime PRN Constipation                              9.2    6.9   )-----------( 234      ( 03 Jul 2017 10:16 )             30.4     03 Jul 2017 10:16    130    |  95     |  37.0   ----------------------------<  298    4.0     |  21.0   |  1.46     Ca    7.5        03 Jul 2017 10:16    TPro  5.5    /  Alb  2.6    /  TBili  1.0    /  DBili  x      /  AST  35     /  ALT  51     /  AlkPhos  70     03 Jul 2017 10:16    Allergies    penicillin (Hives)    Intolerances

## 2017-07-05 NOTE — PROGRESS NOTE ADULT - PROBLEM SELECTOR PROBLEM 1
CAD (coronary artery disease)
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
Elevated troponin
Intra-abdominal free air of unknown etiology
Intra-abdominal free air of unknown etiology
Intractable vomiting with nausea, unspecified vomiting type
CAD (coronary artery disease)
Intra-abdominal free air of unknown etiology
Intra-abdominal free air of unknown etiology

## 2017-07-06 PROCEDURE — 99232 SBSQ HOSP IP/OBS MODERATE 35: CPT

## 2017-07-06 RX ADMIN — MORPHINE SULFATE 10 MILLIGRAM(S): 50 CAPSULE, EXTENDED RELEASE ORAL at 09:10

## 2017-07-06 RX ADMIN — CARVEDILOL PHOSPHATE 3.12 MILLIGRAM(S): 80 CAPSULE, EXTENDED RELEASE ORAL at 05:47

## 2017-07-06 RX ADMIN — Medication 650 MILLIGRAM(S): at 05:47

## 2017-07-06 RX ADMIN — LIDOCAINE 2 PATCH: 4 CREAM TOPICAL at 00:00

## 2017-07-06 RX ADMIN — PANTOPRAZOLE SODIUM 40 MILLIGRAM(S): 20 TABLET, DELAYED RELEASE ORAL at 05:48

## 2017-07-06 RX ADMIN — LISINOPRIL 5 MILLIGRAM(S): 2.5 TABLET ORAL at 05:47

## 2017-07-06 RX ADMIN — MORPHINE SULFATE 10 MILLIGRAM(S): 50 CAPSULE, EXTENDED RELEASE ORAL at 13:23

## 2017-07-06 RX ADMIN — MORPHINE SULFATE 10 MILLIGRAM(S): 50 CAPSULE, EXTENDED RELEASE ORAL at 08:10

## 2017-07-06 RX ADMIN — Medication 3: at 08:44

## 2017-07-06 RX ADMIN — Medication 4: at 11:51

## 2017-07-06 RX ADMIN — Medication 650 MILLIGRAM(S): at 05:48

## 2017-07-06 RX ADMIN — Medication 81 MILLIGRAM(S): at 11:52

## 2017-07-06 RX ADMIN — MORPHINE SULFATE 10 MILLIGRAM(S): 50 CAPSULE, EXTENDED RELEASE ORAL at 12:23

## 2017-07-06 NOTE — PROGRESS NOTE ADULT - SUBJECTIVE AND OBJECTIVE BOX
HPI:  84 years old female with PMH of LUIS ANTONIO Carrion on Coumadin, CHF, DM, HTN, HLD, Colon Cancer s/p Resection, GERD and Dementia  sent from [facility] for evaluation of elevated Troponin I. As per patient, she is having nausea and vomiting for last 2 weeks and her appetite has decreased. She has had multiple episodes of vomiting associated with abdominal discomfort - mostly in epigastric region. Denies abdominal distention, black stool or blood in vomitus. Denies fever or eating anything unusual. Denies chest pain, palpitations, shortness of breath, cough, headache or dizziness.  She had Troponin I (unsure why it was performed) and it was elevated 0.353, so she was sent to [facility].  In ER, she had CT Abd/Pelvis which showed suspected perforation of stomach or duodenum -- seen by Surgery, no surgical intervention at this time as she does not have surgical abdomen.  Also found to have elevated INR (12.64) - no signs of active bleeding. Got Vit K 10 mg IVPB in ER and now getting FFPs x 2. (2017 23:46)    PMH  Other specified disorder of peritoneum  H/o or current diagnosis of HF- Contraindication to ACEI/ARBs  Yes  Family history of diabetes mellitus (Father)  Handoff  MEWS Score  HLD (hyperlipidemia)  Colon cancer  GERD (gastroesophageal reflux disease)  Atrial fibrillation  Diabetes  Hypertension  Intra-abdominal free air of unknown etiology  Constipation, chronic  Right hip pain  Chronic pain syndrome  Coronary artery disease involving other coronary artery bypass graft with angina pectoris  CAD (coronary artery disease)  Elevated troponin  Intra-abdominal free air of unknown etiology  Atrial fibrillation, unspecified type  Secondary hypertension  Diabetes mellitus of other type with complication  Gastroesophageal reflux disease, esophagitis presence not specified  Malignant neoplasm of colon, unspecified part of colon  Intractable vomiting with nausea, unspecified vomiting type  S/P hip replacement, right  H/O exploratory laparotomy  History of appendectomy  No significant past surgical history  ABNORMAL LABS  90+  Intractable vomiting with nausea, unspecified vomiting type      REVIEW OF SYSTEMS  General: Denies fever, chills, pain, + for hip pain.  On comfort measures only due to end stage cardiac disease, not a candidate for cabg,  Respiratory and Thorax:  Denies cough, sob, or any discomfort  Cardiovascular:  Denies chest pain, palpitations or any discomfort	  Gastrointestinal:  Denies n/v/d, constipation, or any discomfort  Genitourinary:  Denies frequency, burning, or pain  Musculoskeletal:  Denies joint pain, swelling, or any discomfort   Neurological:  Denies headache, dizziness blurred vision, numbing or tingling  Psychiatric:  Denies sadness or depression  Hematology  Hal bleeding o swelling    Allergic/Immunologic:	  MEDICATIONS:  carvedilol 3.125 milliGRAM(s) Oral every 12 hours  lisinopril 5 milliGRAM(s) Oral daily  ondansetron Injectable 4 milliGRAM(s) IV Push every 4 hours PRN  acetaminophen   Tablet. 650 milliGRAM(s) Oral every 8 hours  donepezil 10 milliGRAM(s) Oral at bedtime  morphine  - Injectable 2 milliGRAM(s) IV Push every 4 hours PRN  morphine  - Injectable 4 milliGRAM(s) IV Push every 4 hours PRN  morphine Concentrate 10 milliGRAM(s) SubLingual every 4 hours PRN  morphine Concentrate 15 milliGRAM(s) Oral every 4 hours PRN  pantoprazole    Tablet 40 milliGRAM(s) Oral two times a day before meals  bisacodyl Suppository 10 milliGRAM(s) Rectal daily PRN  mineral oil enema 133 milliLiter(s) Rectal once PRN  magnesium hydroxide Suspension 30 milliLiter(s) Oral at bedtime PRN  senna 2 Tablet(s) Oral at bedtime  insulin lispro (HumaLOG) corrective regimen sliding scale   SubCutaneous three times a day before meals  insulin lispro (HumaLOG) corrective regimen sliding scale   SubCutaneous at bedtime  dextrose Gel 1 Dose(s) Oral once PRN  dextrose 50% Injectable 12.5 Gram(s) IV Push once  dextrose 50% Injectable 25 Gram(s) IV Push once  dextrose 50% Injectable 25 Gram(s) IV Push once  glucagon  Injectable 1 milliGRAM(s) IntraMuscular once PRN  aspirin  chewable 81 milliGRAM(s) Oral daily  dextrose 5%. 1000 milliLiter(s) IV Continuous <Continuous>  lidocaine   Patch 2 Patch Transdermal daily        PHYSICAL EXAM:    T(C): 36.2 (17 @ 05:43), Max: 36.8 (17 @ 21:55)  HR: 80 (17 @ 12:20) (80 - 87)  BP: 112/70 (17 @ 12:20) (110/72 - 148/68)  RR: 17 (17 @ 12:20) (16 - 17)  SpO2: 95% (17 @ 12:20) (93% - 99%)  Wt(kg): --  I&O's Summary  2017 07:01  -  2017 07:00  --------------------------------------------------------  IN: 360 mL / OUT: 0 mL / NET: 360 mL      Daily Weight in k (2017 05:00)    Appearance: Normal, obese, frail weak female, comfortable with current dose of morphine  HEENT:   Normal oral mucosa, PERRL, EOMI	  Lymphatic: No lymphadenopathy  Cardiovascular:  No edema  Respiratory: RR wnl for rate and rhythm, color pink  Psychiatry: A & O x 3 Mood & affect appropriate  Gastrointestinal:  Soft, Non-tender, + BS	  Skin: No rashes, No ecchymoses, No cyanosis  Neurologic: Non-focal  Extremities: Normal range of motion, No clubbing, cyanosis or edema  Vascular: Peripheral pulses palpable 2+ bilaterally    CARDIAC MARKERS:        Assessment and Plan:   Problem/Plan - 1:  ·  Problem: CAD (coronary artery disease).  Plan: cont med mgmt.   D.C back to skilled nursing facility  As per MD Marcelo, stop insulin, cardizem, warfarin,     Problem/Plan - 2:  ·  Problem: Right hip pain.  Plan: change to po meds, d/c on morphine sub lingual    Problem/Plan - 3:  ·  Problem: Intractable vomiting with nausea, unspecified vomiting type.  Plan: stable, HPI:  84 years old female with PMH of LUIS ANTONIO Carrion on Coumadin, CHF, DM, HTN, HLD, Colon Cancer s/p Resection, GERD and Dementia  sent from [facility] for evaluation of elevated Troponin I. As per patient, she is having nausea and vomiting for last 2 weeks and her appetite has decreased. She has had multiple episodes of vomiting associated with abdominal discomfort - mostly in epigastric region. Denies abdominal distention, black stool or blood in vomitus. Denies fever or eating anything unusual. Denies chest pain, palpitations, shortness of breath, cough, headache or dizziness.  She had Troponin I (unsure why it was performed) and it was elevated 0.353, so she was sent to [facility].  In ER, she had CT Abd/Pelvis which showed suspected perforation of stomach or duodenum -- seen by Surgery, no surgical intervention at this time as she does not have surgical abdomen.  Also found to have elevated INR (12.64) - no signs of active bleeding. Got Vit K 10 mg IVPB in ER and now getting FFPs x 2. (2017 23:46)    PMH  Other specified disorder of peritoneum  H/o or current diagnosis of HF- Contraindication to ACEI/ARBs  Yes  Family history of diabetes mellitus (Father)  Handoff  MEWS Score  HLD (hyperlipidemia)  Colon cancer  GERD (gastroesophageal reflux disease)  Atrial fibrillation  Diabetes  Hypertension  Intra-abdominal free air of unknown etiology  Constipation, chronic  Right hip pain  Chronic pain syndrome  Coronary artery disease involving other coronary artery bypass graft with angina pectoris  CAD (coronary artery disease)  Elevated troponin  Intra-abdominal free air of unknown etiology  Atrial fibrillation, unspecified type  Secondary hypertension  Diabetes mellitus of other type with complication  Gastroesophageal reflux disease, esophagitis presence not specified  Malignant neoplasm of colon, unspecified part of colon  Intractable vomiting with nausea, unspecified vomiting type  S/P hip replacement, right  H/O exploratory laparotomy  History of appendectomy  No significant past surgical history  ABNORMAL LABS  90+  Intractable vomiting with nausea, unspecified vomiting type      REVIEW OF SYSTEMS  General: Denies fever, chills, pain, + for hip pain.  On comfort measures only due to end stage cardiac disease, not a candidate for cabg,  Respiratory and Thorax:  Denies cough, sob, or any discomfort  Cardiovascular:  Denies chest pain, palpitations or any discomfort	  Gastrointestinal:  Denies n/v/d, constipation, or any discomfort  Genitourinary:  Denies frequency, burning, or pain  Musculoskeletal:  Denies joint pain, swelling, or any discomfort   Neurological:  Denies headache, dizziness blurred vision, numbing or tingling  Psychiatric:  Denies sadness or depression  Hematology  Hal bleeding o swelling    Allergic/Immunologic:	  MEDICATIONS:  carvedilol 3.125 milliGRAM(s) Oral every 12 hours  lisinopril 5 milliGRAM(s) Oral daily  ondansetron Injectable 4 milliGRAM(s) IV Push every 4 hours PRN  acetaminophen   Tablet. 650 milliGRAM(s) Oral every 8 hours  donepezil 10 milliGRAM(s) Oral at bedtime  morphine  - Injectable 2 milliGRAM(s) IV Push every 4 hours PRN  morphine  - Injectable 4 milliGRAM(s) IV Push every 4 hours PRN  morphine Concentrate 10 milliGRAM(s) SubLingual every 4 hours PRN  morphine Concentrate 15 milliGRAM(s) Oral every 4 hours PRN  pantoprazole    Tablet 40 milliGRAM(s) Oral two times a day before meals  bisacodyl Suppository 10 milliGRAM(s) Rectal daily PRN  mineral oil enema 133 milliLiter(s) Rectal once PRN  magnesium hydroxide Suspension 30 milliLiter(s) Oral at bedtime PRN  senna 2 Tablet(s) Oral at bedtime  insulin lispro (HumaLOG) corrective regimen sliding scale   SubCutaneous three times a day before meals  insulin lispro (HumaLOG) corrective regimen sliding scale   SubCutaneous at bedtime  dextrose Gel 1 Dose(s) Oral once PRN  dextrose 50% Injectable 12.5 Gram(s) IV Push once  dextrose 50% Injectable 25 Gram(s) IV Push once  dextrose 50% Injectable 25 Gram(s) IV Push once  glucagon  Injectable 1 milliGRAM(s) IntraMuscular once PRN  aspirin  chewable 81 milliGRAM(s) Oral daily  dextrose 5%. 1000 milliLiter(s) IV Continuous <Continuous>  lidocaine   Patch 2 Patch Transdermal daily        PHYSICAL EXAM:    T(C): 36.2 (17 @ 05:43), Max: 36.8 (17 @ 21:55)  HR: 80 (17 @ 12:20) (80 - 87)  BP: 112/70 (17 @ 12:20) (110/72 - 148/68)  RR: 17 (17 @ 12:20) (16 - 17)  SpO2: 95% (17 @ 12:20) (93% - 99%)  Wt(kg): --  I&O's Summary  2017 07:01  -  2017 07:00  --------------------------------------------------------  IN: 360 mL / OUT: 0 mL / NET: 360 mL      Daily Weight in k (2017 05:00)    Appearance: Normal, obese, frail weak female, comfortable with current dose of morphine  HEENT:   Normal oral mucosa, PERRL, EOMI	  Lymphatic: No lymphadenopathy  Cardiovascular:  No edema  Respiratory: RR wnl for rate and rhythm, color pink  Psychiatry: A & O x 3 Mood & affect appropriate  Gastrointestinal:  Soft, Non-tender, + BS	  Skin: No rashes, No ecchymoses, No cyanosis  Neurologic: Non-focal  Extremities: Normal range of motion, No clubbing, cyanosis or edema  Vascular: Peripheral pulses palpable 2+ bilateral        Assessment and Plan:   Problem/Plan - 1:  ·  Problem: CAD (coronary artery disease).  Plan: cont med mgmt.   D.C back to skilled nursing facility  As per MD Marcelo, stop insulin, cardizem, warfarin, cymbalta  Continue palliative care and comfort measures as recomended by Palliative care team.      Problem/Plan - 2:  ·  Problem: Right hip pain.  Plan: change to po meds, d/c on morphine sub lingual    Problem/Plan - 3:  ·  Problem: Intractable vomiting with nausea, unspecified vomiting type.  Plan: stable, HPI:  84 years old female with PMH of LUIS ANTONIO Carrion on Coumadin, CHF, DM, HTN, HLD, Colon Cancer s/p Resection, GERD and Dementia  sent from [facility] for evaluation of elevated Troponin I. As per patient, she is having nausea and vomiting for last 2 weeks and her appetite has decreased. She has had multiple episodes of vomiting associated with abdominal discomfort - mostly in epigastric region. Denies abdominal distention, black stool or blood in vomitus. Denies fever or eating anything unusual. Denies chest pain, palpitations, shortness of breath, cough, headache or dizziness.  She had Troponin I (unsure why it was performed) and it was elevated 0.353, so she was sent to [facility].  In ER, she had CT Abd/Pelvis which showed suspected perforation of stomach or duodenum -- seen by Surgery, no surgical intervention at this time as she does not have surgical abdomen.  Also found to have elevated INR (12.64) - no signs of active bleeding. Got Vit K 10 mg IVPB in ER and now getting FFPs x 2.  Nausea and vomiting as resolved, will send back to nursing back on Zofran PRN>      PMH  Other specified disorder of peritoneum  H/o or current diagnosis of HF- Contraindication to ACEI/ARBs  Yes  Family history of diabetes mellitus (Father)  Handoff  MEWS Score  HLD (hyperlipidemia)  Colon cancer  GERD (gastroesophageal reflux disease)  Atrial fibrillation  Diabetes  Hypertension  Intra-abdominal free air of unknown etiology  Constipation, chronic  Right hip pain  Chronic pain syndrome  Coronary artery disease involving other coronary artery bypass graft with angina pectoris  CAD (coronary artery disease)  Elevated troponin  Intra-abdominal free air of unknown etiology  Atrial fibrillation, unspecified type  Secondary hypertension  Diabetes mellitus of other type with complication  Gastroesophageal reflux disease, esophagitis presence not specified  Malignant neoplasm of colon, unspecified part of colon  Intractable vomiting with nausea, unspecified vomiting type  S/P hip replacement, right  H/O exploratory laparotomy  History of appendectomy  No significant past surgical history  ABNORMAL LABS  90+  Intractable vomiting with nausea, unspecified vomiting type      REVIEW OF SYSTEMS  General: Denies fever, chills, pain, + for hip pain.  On comfort measures only due to preformations of stomach or duodenum not a candidate for abd surgery  Respiratory and Thorax:  Denies cough, sob, or any discomfort  Cardiovascular:  Denies chest pain, palpitations or any discomfort	  Gastrointestinal:  Denies n/v/d, constipation, or any discomfort  Genitourinary:  Denies frequency, burning, or pain  Musculoskeletal:  Denies joint pain, swelling, or any discomfort   Neurological:  Denies headache, dizziness blurred vision, numbing or tingling  Psychiatric:  Denies sadness or depression  Hematology  Hal bleeding o swelling    Allergic/Immunologic:	  MEDICATIONS:  carvedilol 3.125 milliGRAM(s) Oral every 12 hours  lisinopril 5 milliGRAM(s) Oral daily  ondansetron Injectable 4 milliGRAM(s) IV Push every 4 hours PRN  acetaminophen   Tablet. 650 milliGRAM(s) Oral every 8 hours  donepezil 10 milliGRAM(s) Oral at bedtime  morphine  - Injectable 2 milliGRAM(s) IV Push every 4 hours PRN  morphine  - Injectable 4 milliGRAM(s) IV Push every 4 hours PRN  morphine Concentrate 10 milliGRAM(s) SubLingual every 4 hours PRN  morphine Concentrate 15 milliGRAM(s) Oral every 4 hours PRN  pantoprazole    Tablet 40 milliGRAM(s) Oral two times a day before meals  bisacodyl Suppository 10 milliGRAM(s) Rectal daily PRN  mineral oil enema 133 milliLiter(s) Rectal once PRN  magnesium hydroxide Suspension 30 milliLiter(s) Oral at bedtime PRN  senna 2 Tablet(s) Oral at bedtime  insulin lispro (HumaLOG) corrective regimen sliding scale   SubCutaneous three times a day before meals  insulin lispro (HumaLOG) corrective regimen sliding scale   SubCutaneous at bedtime  dextrose Gel 1 Dose(s) Oral once PRN  dextrose 50% Injectable 12.5 Gram(s) IV Push once  dextrose 50% Injectable 25 Gram(s) IV Push once  dextrose 50% Injectable 25 Gram(s) IV Push once  glucagon  Injectable 1 milliGRAM(s) IntraMuscular once PRN  aspirin  chewable 81 milliGRAM(s) Oral daily  dextrose 5%. 1000 milliLiter(s) IV Continuous <Continuous>  lidocaine   Patch 2 Patch Transdermal daily        PHYSICAL EXAM:    T(C): 36.2 (17 @ 05:43), Max: 36.8 (17 @ 21:55)  HR: 80 (17 @ 12:20) (80 - 87)  BP: 112/70 (17 @ 12:20) (110/72 - 148/68)  RR: 17 (17 @ 12:20) (16 - 17)  SpO2: 95% (17 @ 12:20) (93% - 99%)  Wt(kg): --  I&O's Summary  2017 07:01  -  2017 07:00  --------------------------------------------------------  IN: 360 mL / OUT: 0 mL / NET: 360 mL      Daily Weight in k (2017 05:00)    Appearance: Normal, obese, frail weak female, comfortable with current dose of morphine  HEENT:   Normal oral mucosa, PERRL, EOMI	  Lymphatic: No lymphadenopathy  Cardiovascular:  No edema  Respiratory: RR wnl for rate and rhythm, color pink  Psychiatry: A & O x 3 Mood & affect appropriate  Gastrointestinal:  Soft, Non-tender, + BS	  Skin: No rashes, No ecchymoses, No cyanosis  Neurologic: Non-focal  Extremities: Normal range of motion, No clubbing, cyanosis or edema  Vascular: Peripheral pulses palpable 2+ bilateral        Assessment and Plan:   Problem/Plan - 1:  ·  Problem: CAD (coronary artery disease).  Plan: cont med mgmt.   D.C back to skilled nursing facility  As per MD Marcelo, stop insulin, cardizem, warfarin, cymbalta  Continue palliative care and comfort measures as recommended by Palliative care team for end stage cardiac disease.      Problem/Plan - 2:  ·  Problem: Right hip pain.  Plan: change to po meds, d/c on morphine sub lingual    Problem/Plan - 3:  ·  Problem: Intractable vomiting with nausea, unspecified vomiting type.  Plan: stable on Zofran PRN

## 2017-07-06 NOTE — PROGRESS NOTE ADULT - SUBJECTIVE AND OBJECTIVE BOX
Case discussed and reviewed with Dr. Mensah.  Pt. is not surgical candidate for CABG  Agree w plans for Medical management  Will sign off at this time. Re-call with any new questions or concerns.

## 2017-07-25 VITALS — WEIGHT: 235.89 LBS

## 2017-07-25 NOTE — DISCHARGE NOTE ADULT - SECONDARY DIAGNOSIS.
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

## 2017-07-25 NOTE — DISCHARGE NOTE ADULT - OTHER SIGNIFICANT FINDINGS
D/C summary being entered into [facility] [employer] mgr today (7/25/17) as computer systems down the day of d/c

## 2017-07-25 NOTE — DISCHARGE NOTE ADULT - CARE PLAN
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

## 2017-07-25 NOTE — DISCHARGE NOTE ADULT - HOSPITAL COURSE
85 yo female sent from [facility] with 2 weeks vomiting and abn ekg, troponin  found to have lvef of 20 % on echo  pt and family request only comfort measures, seen by palliative, no further intervention  to return to [facility] [place]

## 2017-07-25 NOTE — DISCHARGE NOTE ADULT - PATIENT PORTAL LINK FT
“You can access the Follow[employer] Patient Portal, offered by [facility], by registering with the following website: [email or web address]”

## 2017-07-25 NOTE — DISCHARGE NOTE ADULT - PLAN OF CARE
pt electing for comfort care measures only and to return to [facility]/Long term facility regular diet

## 2017-10-11 PROCEDURE — 82550 ASSAY OF CK (CPK): CPT

## 2017-10-11 PROCEDURE — 71045 X-RAY EXAM CHEST 1 VIEW: CPT

## 2017-10-11 PROCEDURE — 85027 COMPLETE CBC AUTOMATED: CPT

## 2017-10-11 PROCEDURE — 96375 TX/PRO/DX INJ NEW DRUG ADDON: CPT

## 2017-10-11 PROCEDURE — 83690 ASSAY OF LIPASE: CPT

## 2017-10-11 PROCEDURE — 86140 C-REACTIVE PROTEIN: CPT

## 2017-10-11 PROCEDURE — 86900 BLOOD TYPING SEROLOGIC ABO: CPT

## 2017-10-11 PROCEDURE — 87040 BLOOD CULTURE FOR BACTERIA: CPT

## 2017-10-11 PROCEDURE — 96374 THER/PROPH/DIAG INJ IV PUSH: CPT

## 2017-10-11 PROCEDURE — 80053 COMPREHEN METABOLIC PANEL: CPT

## 2017-10-11 PROCEDURE — C1894: CPT

## 2017-10-11 PROCEDURE — 97530 THERAPEUTIC ACTIVITIES: CPT

## 2017-10-11 PROCEDURE — 86901 BLOOD TYPING SEROLOGIC RH(D): CPT

## 2017-10-11 PROCEDURE — 99285 EMERGENCY DEPT VISIT HI MDM: CPT | Mod: 25

## 2017-10-11 PROCEDURE — 85730 THROMBOPLASTIN TIME PARTIAL: CPT

## 2017-10-11 PROCEDURE — 86850 RBC ANTIBODY SCREEN: CPT

## 2017-10-11 PROCEDURE — 93005 ELECTROCARDIOGRAM TRACING: CPT

## 2017-10-11 PROCEDURE — 80048 BASIC METABOLIC PNL TOTAL CA: CPT

## 2017-10-11 PROCEDURE — 83735 ASSAY OF MAGNESIUM: CPT

## 2017-10-11 PROCEDURE — 85652 RBC SED RATE AUTOMATED: CPT

## 2017-10-11 PROCEDURE — 93306 TTE W/DOPPLER COMPLETE: CPT

## 2017-10-11 PROCEDURE — 70450 CT HEAD/BRAIN W/O DYE: CPT

## 2017-10-11 PROCEDURE — 84484 ASSAY OF TROPONIN QUANT: CPT

## 2017-10-11 PROCEDURE — 74176 CT ABD & PELVIS W/O CONTRAST: CPT

## 2017-10-11 PROCEDURE — 97110 THERAPEUTIC EXERCISES: CPT

## 2017-10-11 PROCEDURE — 83605 ASSAY OF LACTIC ACID: CPT

## 2017-10-11 PROCEDURE — 85610 PROTHROMBIN TIME: CPT

## 2017-10-11 PROCEDURE — C1769: CPT

## 2017-10-11 PROCEDURE — 84100 ASSAY OF PHOSPHORUS: CPT

## 2017-10-11 PROCEDURE — 83036 HEMOGLOBIN GLYCOSYLATED A1C: CPT

## 2017-10-11 PROCEDURE — 97163 PT EVAL HIGH COMPLEX 45 MIN: CPT

## 2017-10-11 PROCEDURE — 36415 COLL VENOUS BLD VENIPUNCTURE: CPT

## 2017-10-11 PROCEDURE — 93458 L HRT ARTERY/VENTRICLE ANGIO: CPT

## 2017-10-11 PROCEDURE — P9059: CPT

## 2017-10-11 PROCEDURE — 36430 TRANSFUSION BLD/BLD COMPNT: CPT

## 2017-10-11 PROCEDURE — C1887: CPT

## 2018-09-17 NOTE — H&P ADULT - NSHPPOAPULMEMBOLUS_GEN_A_CORE
Based on today's office visit note from Dr. Castle, PDT was discussed.  Orders pended for Dr. Castle to review and sign.  Will then route to FC for benefit investigation.    Gris Monsalve RN    
[facility] Call Center    Phone Message    May a detailed message be left on voicemail: yes    Reason for Call: Patient called to let Dr. Castle know she would like the treatment plan where she has to come in not the cream she would do at home. She would like a call back to set that up. Thank you.    Action Taken: Message routed to:  Adult Clinics: Dermatology p [phone]  
Financial Counselor Review:    Procedure to be performed (include CPT code): Photodynamic Therapy - 61999    Diagnosis code (include ICD-10 code): AK (actinic keratosis) [L57.0]     Medication order (include J code):Yes Levulan -     Medication dose and frequency: 1-2 Levulan Sticks    Cosmetic procedure/medication: No    Coverage and patient financial responsibility information: YES    Does patient need to be contacted by Financial Counselor: YES    Route response back to the dermatology patient care [place] - [phone]      Hilda Kerr LPN    
I called [employer]  And spoke with May. This patient's plan follows Medicare guidelines if Medicare covers [employer] will. Patient will have a $15 copay per treatment session until she meets her $4,000 max out of pocket. Current plan accumulations are $250 REF# [id number]    Per [email or web address] Medicare compliance guide CPT 72838 with ICD-10 L57.0 no policy exists     Patient would like to schedule ASAP for late fall   
Writer spoke with patient who would like to start PDT treatments in November. Patient scheduled for November 2 for first treatment. Patient had no questions or concerns at this time. Reviewed with patient importance of sun protection prior to and after treatment. Patient will bring with a wide brimmed hat to her appointment.     Hilda Kerr LPN    
no

## 2021-11-26 NOTE — ED ADULT NURSE NOTE - PMH
Never
Atrial fibrillation    Colon cancer    Diabetes    GERD (gastroesophageal reflux disease)    Hypertension

## 2022-12-20 NOTE — ED PROVIDER NOTE - CPE EDP GASTRO NORM
normal... Qbrexza Pregnancy And Lactation Text: There is no available data on Qbrexza use in pregnant women.  There is no available data on Qbrexza use in lactation.

## 2024-01-29 NOTE — PHYSICAL THERAPY INITIAL EVALUATION ADULT - PRECAUTIONS/LIMITATIONS, REHAB EVAL
fall precautions